# Patient Record
Sex: FEMALE | Race: WHITE | Employment: FULL TIME | URBAN - METROPOLITAN AREA
[De-identification: names, ages, dates, MRNs, and addresses within clinical notes are randomized per-mention and may not be internally consistent; named-entity substitution may affect disease eponyms.]

---

## 2017-12-26 PROBLEM — Z3A.38 38 WEEKS GESTATION OF PREGNANCY: Status: ACTIVE | Noted: 2017-12-26

## 2017-12-30 PROBLEM — Z3A.39 39 WEEKS GESTATION OF PREGNANCY: Status: ACTIVE | Noted: 2017-12-30

## 2020-08-31 ENCOUNTER — TELEPHONE (OUTPATIENT)
Dept: ADMINISTRATIVE | Age: 30
End: 2020-08-31

## 2020-08-31 NOTE — TELEPHONE ENCOUNTER
requesting to become a new pt , asap needs a physical for work , has no preference as to who she sees, please advise

## 2020-09-10 ENCOUNTER — OFFICE VISIT (OUTPATIENT)
Dept: FAMILY MEDICINE CLINIC | Age: 30
End: 2020-09-10
Payer: COMMERCIAL

## 2020-09-10 VITALS
TEMPERATURE: 96.9 F | BODY MASS INDEX: 44.41 KG/M2 | SYSTOLIC BLOOD PRESSURE: 122 MMHG | OXYGEN SATURATION: 99 % | HEART RATE: 97 BPM | RESPIRATION RATE: 14 BRPM | HEIGHT: 68 IN | DIASTOLIC BLOOD PRESSURE: 78 MMHG | WEIGHT: 293 LBS

## 2020-09-10 PROBLEM — Z3A.38 38 WEEKS GESTATION OF PREGNANCY: Status: RESOLVED | Noted: 2017-12-26 | Resolved: 2020-09-10

## 2020-09-10 PROBLEM — Z3A.39 39 WEEKS GESTATION OF PREGNANCY: Status: RESOLVED | Noted: 2017-12-30 | Resolved: 2020-09-10

## 2020-09-10 PROBLEM — F41.9 ANXIETY DISORDER: Status: ACTIVE | Noted: 2017-01-01

## 2020-09-10 PROCEDURE — 1036F TOBACCO NON-USER: CPT | Performed by: NURSE PRACTITIONER

## 2020-09-10 PROCEDURE — 99203 OFFICE O/P NEW LOW 30 MIN: CPT | Performed by: NURSE PRACTITIONER

## 2020-09-10 PROCEDURE — G8419 CALC BMI OUT NRM PARAM NOF/U: HCPCS | Performed by: NURSE PRACTITIONER

## 2020-09-10 PROCEDURE — G8427 DOCREV CUR MEDS BY ELIG CLIN: HCPCS | Performed by: NURSE PRACTITIONER

## 2020-09-10 RX ORDER — ONDANSETRON 4 MG/1
4 TABLET, FILM COATED ORAL EVERY 8 HOURS PRN
COMMUNITY

## 2020-09-10 RX ORDER — METOCLOPRAMIDE 5 MG/1
5 TABLET ORAL 4 TIMES DAILY
COMMUNITY

## 2020-09-10 ASSESSMENT — ENCOUNTER SYMPTOMS
CONSTIPATION: 1
NAUSEA: 0
DIARRHEA: 0
SHORTNESS OF BREATH: 0
COUGH: 0
BACK PAIN: 0
WHEEZING: 0
VOMITING: 0

## 2020-09-10 ASSESSMENT — PATIENT HEALTH QUESTIONNAIRE - PHQ9
1. LITTLE INTEREST OR PLEASURE IN DOING THINGS: 0
2. FEELING DOWN, DEPRESSED OR HOPELESS: 0
SUM OF ALL RESPONSES TO PHQ QUESTIONS 1-9: 0
SUM OF ALL RESPONSES TO PHQ9 QUESTIONS 1 & 2: 0
SUM OF ALL RESPONSES TO PHQ QUESTIONS 1-9: 0

## 2020-09-10 NOTE — PROGRESS NOTES
Chief Complaint   Patient presents with   Elida Hough Establish Care     Used to see dr. Tati Thomason, who is no longer practicing in area. here to establish care. has a physical paper for work. pt is 25 weeks pregnant. HPI: The patient is a 30 y.o. female who presents today to establish care. The patient is 25 weeks pregnant. Suffered from post partum depression after her second child. Was taking Buspar low dose since she was breast feeding. Follows with Dr. Brittanie Cintron. Past Medical History:   Diagnosis Date    44 weeks gestation of pregnancy 12/30/2017    Anxiety disorder 2017    not tx due to pregnancy    Breast disorder 2010    bilateral breastreduction    Encounter for fetal anatomic survey     Migraine     Migraine     Postpartum depression 2014    was not recoginizd until later.        Past Surgical History:   Procedure Laterality Date    BREAST SURGERY      Bilat breast reduction    CHOLECYSTECTOMY  2014    GALLBLADDER SURGERY      REMOVAL      Family History   Problem Relation Age of Onset    No Known Problems Mother     Heart Disease Father     Heart Attack Father     No Known Problems Brother     Breast Cancer Maternal Grandmother      Social History     Socioeconomic History    Marital status: Legally      Spouse name: Not on file    Number of children: Not on file    Years of education: Not on file    Highest education level: Not on file   Occupational History    Not on file   Social Needs    Financial resource strain: Not on file    Food insecurity     Worry: Not on file     Inability: Not on file    Transportation needs     Medical: Not on file     Non-medical: Not on file   Tobacco Use    Smoking status: Former Smoker     Types: Cigarettes    Smokeless tobacco: Former User    Tobacco comment: very rarely   Substance and Sexual Activity    Alcohol use: No     Comment: social drinker    Drug use: No    Sexual activity: Yes     Partners: Male   Lifestyle    Physical activity     Days per week: Not on file     Minutes per session: Not on file    Stress: Not on file   Relationships    Social connections     Talks on phone: Not on file     Gets together: Not on file     Attends Taoism service: Not on file     Active member of club or organization: Not on file     Attends meetings of clubs or organizations: Not on file     Relationship status: Not on file    Intimate partner violence     Fear of current or ex partner: Not on file     Emotionally abused: Not on file     Physically abused: Not on file     Forced sexual activity: Not on file   Other Topics Concern    Not on file   Social History Narrative    Not on file      No Known Allergies     Prior to Visit Medications    Medication Sig Taking? Authorizing Provider   ondansetron (ZOFRAN) 4 MG tablet Take 4 mg by mouth every 8 hours as needed for Nausea or Vomiting Yes Historical Provider, MD   metoclopramide (REGLAN) 5 MG tablet Take 5 mg by mouth 4 times daily Yes Historical Provider, MD Sawyer w/o A-FeCbn-Meth-FA-DHA (PRENATE MINI PO) Take by mouth Yes Historical Provider, MD       Review of Systems   Constitutional: Negative for chills, diaphoresis and fever. Respiratory: Negative for cough, shortness of breath and wheezing. Cardiovascular: Negative for chest pain, palpitations and leg swelling. Gastrointestinal: Positive for constipation. Negative for diarrhea, nausea and vomiting. Endocrine: Negative for cold intolerance, heat intolerance, polydipsia and polyuria. Genitourinary: Positive for frequency. Negative for difficulty urinating. Musculoskeletal: Negative for back pain and neck pain. Neurological: Positive for headaches (occasional). Negative for dizziness and weakness. Physical Exam  Constitutional:       Appearance: She is well-developed. HENT:      Head: Normocephalic and atraumatic. Neck:      Thyroid: No thyromegaly. Trachea: No tracheal deviation. Cardiovascular:      Rate and Rhythm: Normal rate and regular rhythm. Heart sounds: No murmur. Pulmonary:      Effort: Pulmonary effort is normal.      Breath sounds: Normal breath sounds. Abdominal:      General: Bowel sounds are normal.      Palpations: Abdomen is soft. Tenderness: There is no abdominal tenderness. Musculoskeletal: Normal range of motion. Lymphadenopathy:      Cervical: No cervical adenopathy. Skin:     General: Skin is warm and dry. Neurological:      Mental Status: She is alert and oriented to person, place, and time. Psychiatric:         Behavior: Behavior normal.          /78   Pulse 97   Temp 96.9 °F (36.1 °C) (Temporal)   Resp 14   Ht 5' 8\" (1.727 m)   Wt (!) 304 lb (137.9 kg)   SpO2 99%   BMI 46.22 kg/m²     Assessment/Plan:  Onalee Nissen was seen today for establish care. Diagnoses and all orders for this visit:    25 weeks gestation of pregnancy  -  Continue to follow with OBGYN    Migraine without status migrainosus, not intractable, unspecified migraine type  -  Currently under control    Generalized anxiety disorder  -  Doing well, expects issues after her pregnancy  -  Follow up after delivery    Postpartum depression  -  Expects after delivery  -  Follow up after birth    As above. Call or go to ED immediately if symptoms worsen or persist.  No follow-ups on file. , or sooner if necessary. Educational materials and/or home exercises printed for patient's review and were included in patient instructions on his/her After Visit Summary and given to patient at the end of visit. Counseled regarding above diagnosis, including possible risks and complications,  especially if left uncontrolled. Counseled regarding the possibleside effects, risks, benefits and alternatives to treatment; patient and/or guardian verbalizes understanding, agrees, feels comfortable with and wishes to proceed with above treatment plan.     Advised patient to callwith any new medication issues, and read all Rx info from pharmacy to assure aware of all possible risks and side effects of medication before taking. Reviewed age and gender appropriate health screening exams andvaccinations. Advised patient regarding importance of keeping up with recommended health maintenance and to schedule as soon as possible if overdue, as this is important in assessing for undiagnosed pathology, especiallycancer, as well as protecting against potentially harmful/life threatening disease. Patient and/or guardian verbalizes understanding and agrees with above counseling, assessment and plan.

## 2020-11-07 ENCOUNTER — HOSPITAL ENCOUNTER (OUTPATIENT)
Age: 30
Discharge: HOME | End: 2020-11-07
Attending: OBSTETRICS & GYNECOLOGY | Admitting: OBSTETRICS & GYNECOLOGY
Payer: COMMERCIAL

## 2020-11-07 VITALS
RESPIRATION RATE: 16 BRPM | DIASTOLIC BLOOD PRESSURE: 79 MMHG | SYSTOLIC BLOOD PRESSURE: 138 MMHG | TEMPERATURE: 98.2 F | HEIGHT: 68 IN | HEART RATE: 103 BPM | WEIGHT: 293 LBS | BODY MASS INDEX: 44.41 KG/M2

## 2020-11-07 PROBLEM — Z3A.33 33 WEEKS GESTATION OF PREGNANCY: Status: ACTIVE | Noted: 2020-11-07

## 2020-11-07 PROCEDURE — 99211 OFF/OP EST MAY X REQ PHY/QHP: CPT

## 2020-11-07 PROCEDURE — 84112 EVAL AMNIOTIC FLUID PROTEIN: CPT

## 2020-11-07 PROCEDURE — 99201 HC NEW PT, OUTPT VISIT LEVEL 1: CPT

## 2020-11-07 ASSESSMENT — PAIN DESCRIPTION - PAIN TYPE: TYPE: ACUTE PAIN

## 2020-11-07 ASSESSMENT — PAIN DESCRIPTION - ORIENTATION: ORIENTATION: LOWER

## 2020-11-07 ASSESSMENT — PAIN DESCRIPTION - ONSET: ONSET: ON-GOING

## 2020-11-07 ASSESSMENT — PAIN DESCRIPTION - DESCRIPTORS: DESCRIPTORS: CRAMPING

## 2020-11-07 ASSESSMENT — PAIN DESCRIPTION - LOCATION: LOCATION: ABDOMEN

## 2020-11-07 ASSESSMENT — PAIN SCALES - GENERAL: PAINLEVEL_OUTOF10: 5

## 2020-11-07 ASSESSMENT — PAIN - FUNCTIONAL ASSESSMENT: PAIN_FUNCTIONAL_ASSESSMENT: ACTIVITIES ARE NOT PREVENTED

## 2020-11-07 ASSESSMENT — PAIN DESCRIPTION - FREQUENCY: FREQUENCY: INTERMITTENT

## 2020-11-07 ASSESSMENT — PAIN DESCRIPTION - PROGRESSION: CLINICAL_PROGRESSION: GRADUALLY WORSENING

## 2020-11-08 NOTE — PROGRESS NOTES
presents at 33.3 weeks gestation with complaints of contractions since 5pm, states contractions are irregular and she is unable to time them. Rating pain 5 out of 10. Patient also states she may be leaking fluid, amnisure in process. States +FM, denies VB.

## 2020-11-08 NOTE — H&P
Subjective:      Jerrell Frias is an 26 y.o. female at 35 and 3/7 weeks gestation presenting with   Chief Complaint   Patient presents with    Contractions     Since 5pm   .She is also complaining of contractions every a few minutes lasting few seconds. Other associated symptoms include low back pain. Fetal Movement: normal.  Past Medical History:   Diagnosis Date    39 weeks gestation of pregnancy 12/30/2017    Anxiety disorder 2017    not tx due to pregnancy    Breast disorder 2010    bilateral breastreduction    Encounter for fetal anatomic survey     Migraine     Migraine     Postpartum depression 2014    was not recoginizd until later. Review of Systems  Pertinent items are noted in HPI. Objective:      /79   Pulse 103   Temp 98.2 °F (36.8 °C) (Oral)   Resp 16   Ht 5' 8\" (1.727 m)   Wt (!) 304 lb (137.9 kg)   LMP 03/18/2020   BMI 46.22 kg/m²   Blood pressure 138/79, pulse 103, temperature 98.2 °F (36.8 °C), temperature source Oral, resp. rate 16, height 5' 8\" (1.727 m), weight (!) 304 lb (137.9 kg), last menstrual period 03/18/2020, unknown if currently breastfeeding. General:   alert, appears stated age and cooperative   Cervix:   closed.    FHT:   130 BPM     Lab Review    CBC:   Lab Results   Component Value Date    WBC 13.0 12/31/2017    RBC 3.93 12/31/2017    HGB 10.9 12/31/2017    HCT 34.0 12/31/2017    MCV 86.5 12/31/2017    MCH 27.7 12/31/2017    MCHC 32.1 12/31/2017    RDW 15.7 12/31/2017     12/31/2017    MPV 10.8 12/31/2017     CMP:    Lab Results   Component Value Date     09/11/2017    K 4.1 09/11/2017     09/11/2017    CO2 24 09/11/2017    BUN 7 09/11/2017    CREATININE 0.6 09/11/2017    GFRAA >60 09/11/2017    LABGLOM >60 09/11/2017    GLUCOSE 79 09/11/2017    PROT 6.3 09/11/2017    LABALBU 3.2 09/11/2017    CALCIUM 8.4 09/11/2017    BILITOT 0.3 09/11/2017    ALKPHOS 70 09/11/2017    AST 12 09/11/2017    ALT 12 09/11/2017     U/A:    Lab Results   Component Value Date    COLORU Yellow 09/11/2017    PHUR 6.0 09/11/2017    WBCUA >20 09/11/2017    RBCUA >20 09/11/2017    MUCUS Present 09/11/2017    BACTERIA MANY 09/11/2017    CLARITYU CLOUDY 09/11/2017    SPECGRAV >=1.030 09/11/2017    LEUKOCYTESUR SMALL 09/11/2017    UROBILINOGEN 0.2 09/11/2017    BILIRUBINUR Negative 09/11/2017    BLOODU LARGE 09/11/2017    GLUCOSEU Negative 09/11/2017     Amnisure: negative     Assessment:      Threatened Premature Labor      Plan:      Monitored for contractions - findings: no contractions noted and reactive strip. Orders: no treatment necessary.      Stephania Thacker MD,11/7/2020 9:15 PM

## 2020-11-15 LAB — SARS-COV-2: ABNORMAL

## 2020-11-17 RX ORDER — MULTIVIT WITH MINERALS/LUTEIN
1000 TABLET ORAL DAILY
Qty: 30 TABLET | Refills: 3 | Status: SHIPPED
Start: 2020-11-17 | End: 2021-03-22

## 2020-11-17 RX ORDER — ALBUTEROL SULFATE 90 UG/1
2 AEROSOL, METERED RESPIRATORY (INHALATION) EVERY 6 HOURS PRN
Qty: 1 INHALER | Refills: 5 | Status: SHIPPED | OUTPATIENT
Start: 2020-11-17

## 2020-11-17 RX ORDER — B-COMPLEX WITH VITAMIN C
1 TABLET ORAL DAILY
Qty: 30 TABLET | Refills: 0 | Status: ON HOLD
Start: 2020-11-17 | End: 2020-12-17 | Stop reason: HOSPADM

## 2020-11-17 RX ORDER — CHOLECALCIFEROL (VITAMIN D3) 50 MCG
2000 TABLET ORAL DAILY
Qty: 30 TABLET | Refills: 0 | Status: SHIPPED
Start: 2020-11-17 | End: 2020-12-28

## 2020-11-19 ENCOUNTER — HOSPITAL ENCOUNTER (INPATIENT)
Age: 30
LOS: 3 days | Discharge: HOME | DRG: 566 | End: 2020-11-23
Attending: EMERGENCY MEDICINE | Admitting: OBSTETRICS & GYNECOLOGY
Payer: COMMERCIAL

## 2020-11-19 ENCOUNTER — APPOINTMENT (OUTPATIENT)
Dept: GENERAL RADIOLOGY | Age: 30
DRG: 566 | End: 2020-11-19
Payer: COMMERCIAL

## 2020-11-19 LAB
ALBUMIN SERPL-MCNC: 3.1 G/DL (ref 3.5–5.2)
ALP BLD-CCNC: 234 U/L (ref 35–104)
ALT SERPL-CCNC: 53 U/L (ref 0–32)
ANION GAP SERPL CALCULATED.3IONS-SCNC: 19 MMOL/L (ref 7–16)
AST SERPL-CCNC: 52 U/L (ref 0–31)
ATYPICAL LYMPHOCYTE RELATIVE PERCENT: 2 % (ref 0–4)
BASOPHILS ABSOLUTE: 0 E9/L (ref 0–0.2)
BASOPHILS RELATIVE PERCENT: 0 % (ref 0–2)
BILIRUB SERPL-MCNC: 1.8 MG/DL (ref 0–1.2)
BILIRUBIN DIRECT: 1.5 MG/DL (ref 0–0.3)
BILIRUBIN, INDIRECT: 0.3 MG/DL (ref 0–1)
BUN BLDV-MCNC: 5 MG/DL (ref 6–20)
BURR CELLS: ABNORMAL
CALCIUM SERPL-MCNC: 8.8 MG/DL (ref 8.6–10.2)
CHLORIDE BLD-SCNC: 102 MMOL/L (ref 98–107)
CO2: 19 MMOL/L (ref 22–29)
CREAT SERPL-MCNC: 0.7 MG/DL (ref 0.5–1)
EOSINOPHILS ABSOLUTE: 0 E9/L (ref 0.05–0.5)
EOSINOPHILS RELATIVE PERCENT: 0 % (ref 0–6)
GFR AFRICAN AMERICAN: >60
GFR NON-AFRICAN AMERICAN: >60 ML/MIN/1.73
GLUCOSE BLD-MCNC: 115 MG/DL (ref 74–99)
HCT VFR BLD CALC: 47.6 % (ref 34–48)
HEMOGLOBIN: 14.7 G/DL (ref 11.5–15.5)
LIPASE: 9 U/L (ref 13–60)
LYMPHOCYTES ABSOLUTE: 1.2 E9/L (ref 1.5–4)
LYMPHOCYTES RELATIVE PERCENT: 7 % (ref 20–42)
MCH RBC QN AUTO: 27.2 PG (ref 26–35)
MCHC RBC AUTO-ENTMCNC: 30.9 % (ref 32–34.5)
MCV RBC AUTO: 88 FL (ref 80–99.9)
METAMYELOCYTES RELATIVE PERCENT: 6 % (ref 0–1)
MONOCYTES ABSOLUTE: 0.27 E9/L (ref 0.1–0.95)
MONOCYTES RELATIVE PERCENT: 2 % (ref 2–12)
MYELOCYTE PERCENT: 3 % (ref 0–0)
NEUTROPHILS ABSOLUTE: 11.84 E9/L (ref 1.8–7.3)
NEUTROPHILS RELATIVE PERCENT: 80 % (ref 43–80)
OVALOCYTES: ABNORMAL
PDW BLD-RTO: 16.4 FL (ref 11.5–15)
PLATELET # BLD: 307 E9/L (ref 130–450)
PMV BLD AUTO: 10.1 FL (ref 7–12)
POIKILOCYTES: ABNORMAL
POLYCHROMASIA: ABNORMAL
POTASSIUM SERPL-SCNC: 3.5 MMOL/L (ref 3.5–5)
RBC # BLD: 5.41 E12/L (ref 3.5–5.5)
SODIUM BLD-SCNC: 140 MMOL/L (ref 132–146)
TOTAL PROTEIN: 7.4 G/DL (ref 6.4–8.3)
TROPONIN: <0.01 NG/ML (ref 0–0.03)
VACUOLATED NEUTROPHILS: ABNORMAL
WBC # BLD: 13.3 E9/L (ref 4.5–11.5)

## 2020-11-19 PROCEDURE — 80076 HEPATIC FUNCTION PANEL: CPT

## 2020-11-19 PROCEDURE — 99284 EMERGENCY DEPT VISIT MOD MDM: CPT

## 2020-11-19 PROCEDURE — 96374 THER/PROPH/DIAG INJ IV PUSH: CPT

## 2020-11-19 PROCEDURE — 93005 ELECTROCARDIOGRAM TRACING: CPT | Performed by: EMERGENCY MEDICINE

## 2020-11-19 PROCEDURE — 80048 BASIC METABOLIC PNL TOTAL CA: CPT

## 2020-11-19 PROCEDURE — 6360000002 HC RX W HCPCS: Performed by: EMERGENCY MEDICINE

## 2020-11-19 PROCEDURE — 85025 COMPLETE CBC W/AUTO DIFF WBC: CPT

## 2020-11-19 PROCEDURE — 71045 X-RAY EXAM CHEST 1 VIEW: CPT

## 2020-11-19 PROCEDURE — 84484 ASSAY OF TROPONIN QUANT: CPT

## 2020-11-19 PROCEDURE — 2580000003 HC RX 258: Performed by: EMERGENCY MEDICINE

## 2020-11-19 PROCEDURE — 83690 ASSAY OF LIPASE: CPT

## 2020-11-19 RX ORDER — SODIUM CHLORIDE 0.9 % (FLUSH) 0.9 %
SYRINGE (ML) INJECTION
Status: DISPENSED
Start: 2020-11-19 | End: 2020-11-20

## 2020-11-19 RX ORDER — 0.9 % SODIUM CHLORIDE 0.9 %
1000 INTRAVENOUS SOLUTION INTRAVENOUS ONCE
Status: COMPLETED | OUTPATIENT
Start: 2020-11-19 | End: 2020-11-20

## 2020-11-19 RX ORDER — ONDANSETRON 2 MG/ML
4 INJECTION INTRAMUSCULAR; INTRAVENOUS ONCE
Status: COMPLETED | OUTPATIENT
Start: 2020-11-19 | End: 2020-11-19

## 2020-11-19 RX ADMIN — SODIUM CHLORIDE 1000 ML: 9 INJECTION, SOLUTION INTRAVENOUS at 23:36

## 2020-11-19 RX ADMIN — ONDANSETRON 4 MG: 2 INJECTION INTRAMUSCULAR; INTRAVENOUS at 23:36

## 2020-11-19 NOTE — ED NOTES
FIRST PROVIDER CONTACT ASSESSMENT NOTE      Department of Emergency Medicine   11/19/20  5:20 PM EST    Chief Complaint: Shortness of Breath (COVID pos on sunday ); Chest Congestion; Cough; Fever; and Generalized Body Aches      History of Present Illness:   Colten Leonardo is a 26 y.o. female who presents to the ED for shortness of breath, chest pain cough, fever. She states she has generalized body aches. She is currently 35 weeks pregnant. She tested positive for Covid on Sunday and her symptoms started a week ago. Her OB/GYN is Dr. Nicola Cochran. She denies any vaginal bleeding or discharge. Patient states her fever was 103 but she did not take anything her for fever at that time. Patient has been completing fetal kicks. Medical History:  has a past medical history of 39 weeks gestation of pregnancy, Anxiety disorder, Breast disorder, Encounter for fetal anatomic survey, Migraine, Migraine, and Postpartum depression. Surgical History:  has a past surgical history that includes Breast surgery; Cholecystectomy (2014); and Gallbladder surgery. Social History:  reports that she has quit smoking. Her smoking use included cigarettes. She has quit using smokeless tobacco. She reports that she does not drink alcohol or use drugs. Family History: family history includes Breast Cancer in her maternal grandmother; Heart Attack in her father; Heart Disease in her father; No Known Problems in her brother and mother. *ALLERGIES*     Patient has no known allergies. Physical Exam:      VS:  /85   Pulse 121   Temp 98.5 °F (36.9 °C)   Resp 18   Ht 5' 8\" (1.727 m)   Wt (!) 304 lb (137.9 kg)   LMP 03/18/2020   SpO2 93%   BMI 46.22 kg/m²      Initial Plan of Care:  Initiate Treatment-Testing, Proceed toTreatment Area When Bed Available for ED Attending/MLP to Continue Care.   Instructed patient to notify nursing staff of any worsening symptoms to be reevaluated  -----------------END OF FIRST PROVIDER

## 2020-11-20 PROBLEM — U07.1 ACUTE RESPIRATORY FAILURE DUE TO COVID-19 (HCC): Status: ACTIVE | Noted: 2020-11-20

## 2020-11-20 PROBLEM — R80.9 PROTEINURIA: Status: ACTIVE | Noted: 2020-11-20

## 2020-11-20 PROBLEM — R74.8 ELEVATED LIVER ENZYMES: Status: ACTIVE | Noted: 2020-11-20

## 2020-11-20 PROBLEM — U07.1 PNEUMONIA DUE TO COVID-19 VIRUS: Status: ACTIVE | Noted: 2020-11-20

## 2020-11-20 PROBLEM — J96.00 ACUTE RESPIRATORY FAILURE DUE TO COVID-19 (HCC): Status: ACTIVE | Noted: 2020-11-20

## 2020-11-20 PROBLEM — J12.82 PNEUMONIA DUE TO COVID-19 VIRUS: Status: ACTIVE | Noted: 2020-11-20

## 2020-11-20 PROBLEM — Z3A.35 35 WEEKS GESTATION OF PREGNANCY: Status: ACTIVE | Noted: 2020-11-20

## 2020-11-20 LAB
ABO/RH: NORMAL
ALBUMIN SERPL-MCNC: 2.7 G/DL (ref 3.5–5.2)
ALBUMIN SERPL-MCNC: 2.9 G/DL (ref 3.5–5.2)
ALP BLD-CCNC: 180 U/L (ref 35–104)
ALP BLD-CCNC: 194 U/L (ref 35–104)
ALT SERPL-CCNC: 44 U/L (ref 0–32)
ALT SERPL-CCNC: 49 U/L (ref 0–32)
AMORPHOUS: ABNORMAL
AMPHETAMINE SCREEN, URINE: NOT DETECTED
ANION GAP SERPL CALCULATED.3IONS-SCNC: 15 MMOL/L (ref 7–16)
ANION GAP SERPL CALCULATED.3IONS-SCNC: 17 MMOL/L (ref 7–16)
ANTIBODY SCREEN: NORMAL
AST SERPL-CCNC: 51 U/L (ref 0–31)
AST SERPL-CCNC: 68 U/L (ref 0–31)
BACTERIA: ABNORMAL /HPF
BACTERIA: ABNORMAL /HPF
BARBITURATE SCREEN URINE: NOT DETECTED
BASOPHILS ABSOLUTE: 0 E9/L (ref 0–0.2)
BASOPHILS RELATIVE PERCENT: 0 % (ref 0–2)
BENZODIAZEPINE SCREEN, URINE: NOT DETECTED
BILIRUB SERPL-MCNC: 1.8 MG/DL (ref 0–1.2)
BILIRUB SERPL-MCNC: 1.9 MG/DL (ref 0–1.2)
BILIRUBIN URINE: ABNORMAL
BILIRUBIN URINE: ABNORMAL
BLOOD, URINE: ABNORMAL
BLOOD, URINE: NEGATIVE
BUN BLDV-MCNC: 7 MG/DL (ref 6–20)
BUN BLDV-MCNC: 8 MG/DL (ref 6–20)
BURR CELLS: ABNORMAL
C-REACTIVE PROTEIN: 7.5 MG/DL (ref 0–0.4)
CALCIUM SERPL-MCNC: 8.2 MG/DL (ref 8.6–10.2)
CALCIUM SERPL-MCNC: 8.3 MG/DL (ref 8.6–10.2)
CANNABINOID SCREEN URINE: NOT DETECTED
CHLORIDE BLD-SCNC: 103 MMOL/L (ref 98–107)
CHLORIDE BLD-SCNC: 103 MMOL/L (ref 98–107)
CLARITY: ABNORMAL
CLARITY: CLEAR
CO2: 18 MMOL/L (ref 22–29)
CO2: 20 MMOL/L (ref 22–29)
COCAINE METABOLITE SCREEN URINE: NOT DETECTED
COLOR: ABNORMAL
COLOR: YELLOW
CREAT SERPL-MCNC: 0.6 MG/DL (ref 0.5–1)
CREAT SERPL-MCNC: 0.6 MG/DL (ref 0.5–1)
CREATININE URINE: 273 MG/DL (ref 29–226)
D DIMER: 512 NG/ML DDU
EKG ATRIAL RATE: 124 BPM
EKG P AXIS: 74 DEGREES
EKG P-R INTERVAL: 138 MS
EKG Q-T INTERVAL: 300 MS
EKG QRS DURATION: 72 MS
EKG QTC CALCULATION (BAZETT): 431 MS
EKG R AXIS: 73 DEGREES
EKG T AXIS: 66 DEGREES
EKG VENTRICULAR RATE: 124 BPM
EOSINOPHILS ABSOLUTE: 0 E9/L (ref 0.05–0.5)
EOSINOPHILS RELATIVE PERCENT: 0 % (ref 0–6)
EPITHELIAL CELLS, UA: ABNORMAL /HPF
FENTANYL SCREEN, URINE: NOT DETECTED
GFR AFRICAN AMERICAN: >60
GFR AFRICAN AMERICAN: >60
GFR NON-AFRICAN AMERICAN: >60 ML/MIN/1.73
GFR NON-AFRICAN AMERICAN: >60 ML/MIN/1.73
GLUCOSE BLD-MCNC: 131 MG/DL (ref 74–99)
GLUCOSE BLD-MCNC: 151 MG/DL (ref 74–99)
GLUCOSE URINE: 100 MG/DL
GLUCOSE URINE: 100 MG/DL
HCT VFR BLD CALC: 41.8 % (ref 34–48)
HCT VFR BLD CALC: 45.6 % (ref 34–48)
HEMOGLOBIN: 12.8 G/DL (ref 11.5–15.5)
HEMOGLOBIN: 13.8 G/DL (ref 11.5–15.5)
KETONES, URINE: >=80 MG/DL
KETONES, URINE: >=80 MG/DL
LEUKOCYTE ESTERASE, URINE: NEGATIVE
LEUKOCYTE ESTERASE, URINE: NEGATIVE
LYMPHOCYTES ABSOLUTE: 0.76 E9/L (ref 1.5–4)
LYMPHOCYTES RELATIVE PERCENT: 7.8 % (ref 20–42)
Lab: NORMAL
MCH RBC QN AUTO: 27.1 PG (ref 26–35)
MCH RBC QN AUTO: 27.4 PG (ref 26–35)
MCHC RBC AUTO-ENTMCNC: 30.3 % (ref 32–34.5)
MCHC RBC AUTO-ENTMCNC: 30.6 % (ref 32–34.5)
MCV RBC AUTO: 89.5 FL (ref 80–99.9)
MCV RBC AUTO: 89.6 FL (ref 80–99.9)
METHADONE SCREEN, URINE: NOT DETECTED
MONOCYTES ABSOLUTE: 0.19 E9/L (ref 0.1–0.95)
MONOCYTES RELATIVE PERCENT: 1.8 % (ref 2–12)
NEUTROPHILS ABSOLUTE: 8.55 E9/L (ref 1.8–7.3)
NEUTROPHILS RELATIVE PERCENT: 90.4 % (ref 43–80)
NITRITE, URINE: NEGATIVE
NITRITE, URINE: POSITIVE
NUCLEATED RED BLOOD CELLS: 0 /100 WBC
OPIATE SCREEN URINE: NOT DETECTED
OXYCODONE URINE: NOT DETECTED
PDW BLD-RTO: 16.2 FL (ref 11.5–15)
PDW BLD-RTO: 16.3 FL (ref 11.5–15)
PH UA: 6 (ref 5–9)
PH UA: 6.5 (ref 5–9)
PHENCYCLIDINE SCREEN URINE: NOT DETECTED
PLATELET # BLD: 285 E9/L (ref 130–450)
PLATELET # BLD: 290 E9/L (ref 130–450)
PMV BLD AUTO: 10.3 FL (ref 7–12)
PMV BLD AUTO: 10.5 FL (ref 7–12)
POIKILOCYTES: ABNORMAL
POLYCHROMASIA: ABNORMAL
POTASSIUM SERPL-SCNC: 3.1 MMOL/L (ref 3.5–5)
POTASSIUM SERPL-SCNC: 3.9 MMOL/L (ref 3.5–5)
PROCALCITONIN: 0.15 NG/ML (ref 0–0.08)
PROTEIN PROTEIN: 169 MG/DL (ref 0–12)
PROTEIN UA: 100 MG/DL
PROTEIN UA: 100 MG/DL
PROTEIN/CREAT RATIO: 0.6
PROTEIN/CREAT RATIO: 0.6 (ref 0–0.2)
RBC # BLD: 4.67 E12/L (ref 3.5–5.5)
RBC # BLD: 5.09 E12/L (ref 3.5–5.5)
RBC UA: ABNORMAL /HPF (ref 0–2)
RBC UA: ABNORMAL /HPF (ref 0–2)
SODIUM BLD-SCNC: 138 MMOL/L (ref 132–146)
SODIUM BLD-SCNC: 138 MMOL/L (ref 132–146)
SPECIFIC GRAVITY UA: >=1.03 (ref 1–1.03)
SPECIFIC GRAVITY UA: >=1.03 (ref 1–1.03)
TOTAL PROTEIN: 6.3 G/DL (ref 6.4–8.3)
TOTAL PROTEIN: 6.8 G/DL (ref 6.4–8.3)
TROPONIN: <0.01 NG/ML (ref 0–0.03)
UROBILINOGEN, URINE: 2 E.U./DL
UROBILINOGEN, URINE: 4 E.U./DL
WBC # BLD: 10.9 E9/L (ref 4.5–11.5)
WBC # BLD: 9.5 E9/L (ref 4.5–11.5)
WBC UA: ABNORMAL /HPF (ref 0–5)
WBC UA: ABNORMAL /HPF (ref 0–5)

## 2020-11-20 PROCEDURE — 36415 COLL VENOUS BLD VENIPUNCTURE: CPT

## 2020-11-20 PROCEDURE — 76805 OB US >/= 14 WKS SNGL FETUS: CPT

## 2020-11-20 PROCEDURE — 84156 ASSAY OF PROTEIN URINE: CPT

## 2020-11-20 PROCEDURE — 99233 SBSQ HOSP IP/OBS HIGH 50: CPT | Performed by: FAMILY MEDICINE

## 2020-11-20 PROCEDURE — 80053 COMPREHEN METABOLIC PANEL: CPT

## 2020-11-20 PROCEDURE — 86901 BLOOD TYPING SEROLOGIC RH(D): CPT

## 2020-11-20 PROCEDURE — 51702 INSERT TEMP BLADDER CATH: CPT

## 2020-11-20 PROCEDURE — 81001 URINALYSIS AUTO W/SCOPE: CPT

## 2020-11-20 PROCEDURE — 1220000000 HC SEMI PRIVATE OB R&B

## 2020-11-20 PROCEDURE — 82570 ASSAY OF URINE CREATININE: CPT

## 2020-11-20 PROCEDURE — 76819 FETAL BIOPHYS PROFIL W/O NST: CPT | Performed by: OBSTETRICS & GYNECOLOGY

## 2020-11-20 PROCEDURE — 76819 FETAL BIOPHYS PROFIL W/O NST: CPT

## 2020-11-20 PROCEDURE — 85025 COMPLETE CBC W/AUTO DIFF WBC: CPT

## 2020-11-20 PROCEDURE — 93010 ELECTROCARDIOGRAM REPORT: CPT | Performed by: INTERNAL MEDICINE

## 2020-11-20 PROCEDURE — 6360000002 HC RX W HCPCS

## 2020-11-20 PROCEDURE — 6370000000 HC RX 637 (ALT 250 FOR IP): Performed by: FAMILY MEDICINE

## 2020-11-20 PROCEDURE — 76805 OB US >/= 14 WKS SNGL FETUS: CPT | Performed by: OBSTETRICS & GYNECOLOGY

## 2020-11-20 PROCEDURE — 99253 IP/OBS CNSLTJ NEW/EST LOW 45: CPT | Performed by: OBSTETRICS & GYNECOLOGY

## 2020-11-20 PROCEDURE — 86140 C-REACTIVE PROTEIN: CPT

## 2020-11-20 PROCEDURE — 84145 PROCALCITONIN (PCT): CPT

## 2020-11-20 PROCEDURE — 80307 DRUG TEST PRSMV CHEM ANLYZR: CPT

## 2020-11-20 PROCEDURE — 85027 COMPLETE CBC AUTOMATED: CPT

## 2020-11-20 PROCEDURE — 84484 ASSAY OF TROPONIN QUANT: CPT

## 2020-11-20 PROCEDURE — 86900 BLOOD TYPING SEROLOGIC ABO: CPT

## 2020-11-20 PROCEDURE — 76820 UMBILICAL ARTERY ECHO: CPT

## 2020-11-20 PROCEDURE — 6360000002 HC RX W HCPCS: Performed by: INTERNAL MEDICINE

## 2020-11-20 PROCEDURE — 86850 RBC ANTIBODY SCREEN: CPT

## 2020-11-20 PROCEDURE — 2580000003 HC RX 258: Performed by: OBSTETRICS & GYNECOLOGY

## 2020-11-20 PROCEDURE — 6360000002 HC RX W HCPCS: Performed by: OBSTETRICS & GYNECOLOGY

## 2020-11-20 PROCEDURE — 6370000000 HC RX 637 (ALT 250 FOR IP): Performed by: INTERNAL MEDICINE

## 2020-11-20 PROCEDURE — 85378 FIBRIN DEGRADE SEMIQUANT: CPT

## 2020-11-20 PROCEDURE — 76820 UMBILICAL ARTERY ECHO: CPT | Performed by: OBSTETRICS & GYNECOLOGY

## 2020-11-20 PROCEDURE — 99253 IP/OBS CNSLTJ NEW/EST LOW 45: CPT | Performed by: INTERNAL MEDICINE

## 2020-11-20 RX ORDER — POTASSIUM BICARBONATE 25 MEQ/1
25 TABLET, EFFERVESCENT ORAL 2 TIMES DAILY
Status: DISCONTINUED | OUTPATIENT
Start: 2020-11-20 | End: 2020-11-20 | Stop reason: CLARIF

## 2020-11-20 RX ORDER — DEXTROSE, SODIUM CHLORIDE, SODIUM LACTATE, POTASSIUM CHLORIDE, AND CALCIUM CHLORIDE 5; .6; .31; .03; .02 G/100ML; G/100ML; G/100ML; G/100ML; G/100ML
INJECTION, SOLUTION INTRAVENOUS ONCE
Status: COMPLETED | OUTPATIENT
Start: 2020-11-20 | End: 2020-11-20

## 2020-11-20 RX ORDER — SODIUM CHLORIDE, SODIUM LACTATE, POTASSIUM CHLORIDE, CALCIUM CHLORIDE 600; 310; 30; 20 MG/100ML; MG/100ML; MG/100ML; MG/100ML
INJECTION, SOLUTION INTRAVENOUS CONTINUOUS
Status: DISCONTINUED | OUTPATIENT
Start: 2020-11-20 | End: 2020-11-23 | Stop reason: HOSPADM

## 2020-11-20 RX ORDER — ACETAMINOPHEN 500 MG
500 TABLET ORAL EVERY 4 HOURS PRN
Status: DISCONTINUED | OUTPATIENT
Start: 2020-11-20 | End: 2020-11-23 | Stop reason: HOSPADM

## 2020-11-20 RX ORDER — ACETAMINOPHEN AND CODEINE PHOSPHATE 300; 30 MG/1; MG/1
1 TABLET ORAL EVERY 4 HOURS PRN
Status: DISCONTINUED | OUTPATIENT
Start: 2020-11-20 | End: 2020-11-23 | Stop reason: HOSPADM

## 2020-11-20 RX ORDER — BENZONATATE 100 MG/1
100 CAPSULE ORAL 3 TIMES DAILY PRN
Status: DISCONTINUED | OUTPATIENT
Start: 2020-11-20 | End: 2020-11-23 | Stop reason: HOSPADM

## 2020-11-20 RX ORDER — ONDANSETRON 2 MG/ML
4 INJECTION INTRAMUSCULAR; INTRAVENOUS ONCE
Status: COMPLETED | OUTPATIENT
Start: 2020-11-20 | End: 2020-11-20

## 2020-11-20 RX ORDER — ONDANSETRON 2 MG/ML
4 INJECTION INTRAMUSCULAR; INTRAVENOUS EVERY 6 HOURS PRN
Status: DISCONTINUED | OUTPATIENT
Start: 2020-11-20 | End: 2020-11-23 | Stop reason: HOSPADM

## 2020-11-20 RX ORDER — BETAMETHASONE SODIUM PHOSPHATE AND BETAMETHASONE ACETATE 3; 3 MG/ML; MG/ML
12 INJECTION, SUSPENSION INTRA-ARTICULAR; INTRALESIONAL; INTRAMUSCULAR; SOFT TISSUE DAILY
Status: DISCONTINUED | OUTPATIENT
Start: 2020-11-20 | End: 2020-11-22

## 2020-11-20 RX ORDER — ONDANSETRON 2 MG/ML
INJECTION INTRAMUSCULAR; INTRAVENOUS
Status: COMPLETED
Start: 2020-11-20 | End: 2020-11-20

## 2020-11-20 RX ORDER — CEFTRIAXONE 1 G/1
INJECTION, POWDER, FOR SOLUTION INTRAMUSCULAR; INTRAVENOUS
Status: DISPENSED
Start: 2020-11-20 | End: 2020-11-21

## 2020-11-20 RX ORDER — DEXAMETHASONE SODIUM PHOSPHATE 10 MG/ML
6 INJECTION, SOLUTION INTRAMUSCULAR; INTRAVENOUS ONCE
Status: COMPLETED | OUTPATIENT
Start: 2020-11-20 | End: 2020-11-20

## 2020-11-20 RX ORDER — PROMETHAZINE HYDROCHLORIDE 25 MG/ML
6.25 INJECTION, SOLUTION INTRAMUSCULAR; INTRAVENOUS EVERY 6 HOURS PRN
Status: DISCONTINUED | OUTPATIENT
Start: 2020-11-20 | End: 2020-11-23 | Stop reason: HOSPADM

## 2020-11-20 RX ADMIN — BETAMETHASONE SODIUM PHOSPHATE AND BETAMETHASONE ACETATE 12 MG: 3; 3 INJECTION, SUSPENSION INTRA-ARTICULAR; INTRALESIONAL; INTRAMUSCULAR; SOFT TISSUE at 07:41

## 2020-11-20 RX ADMIN — POTASSIUM BICARBONATE 20 MEQ: 782 TABLET, EFFERVESCENT ORAL at 21:00

## 2020-11-20 RX ADMIN — PROMETHAZINE HYDROCHLORIDE 6.25 MG: 25 INJECTION INTRAMUSCULAR; INTRAVENOUS at 17:17

## 2020-11-20 RX ADMIN — SODIUM CHLORIDE, SODIUM LACTATE, POTASSIUM CHLORIDE, CALCIUM CHLORIDE AND DEXTROSE MONOHYDRATE: 5; 600; 310; 30; 20 INJECTION, SOLUTION INTRAVENOUS at 01:37

## 2020-11-20 RX ADMIN — CEFTRIAXONE 1 G: 1 INJECTION, POWDER, FOR SOLUTION INTRAMUSCULAR; INTRAVENOUS at 15:36

## 2020-11-20 RX ADMIN — BENZONATATE 100 MG: 100 CAPSULE ORAL at 21:11

## 2020-11-20 RX ADMIN — PROMETHAZINE HYDROCHLORIDE 6.25 MG: 25 INJECTION INTRAMUSCULAR; INTRAVENOUS at 11:13

## 2020-11-20 RX ADMIN — DEXAMETHASONE SODIUM PHOSPHATE 6 MG: 10 INJECTION, SOLUTION INTRAMUSCULAR; INTRAVENOUS at 03:46

## 2020-11-20 RX ADMIN — ONDANSETRON 4 MG: 2 INJECTION INTRAMUSCULAR; INTRAVENOUS at 02:16

## 2020-11-20 RX ADMIN — ENOXAPARIN SODIUM 30 MG: 40 INJECTION SUBCUTANEOUS at 20:58

## 2020-11-20 RX ADMIN — PROMETHAZINE HYDROCHLORIDE 6.25 MG: 25 INJECTION INTRAMUSCULAR; INTRAVENOUS at 22:48

## 2020-11-20 RX ADMIN — PROMETHAZINE HYDROCHLORIDE 6.25 MG: 25 INJECTION INTRAMUSCULAR; INTRAVENOUS at 04:39

## 2020-11-20 RX ADMIN — ONDANSETRON 4 MG: 2 INJECTION INTRAMUSCULAR; INTRAVENOUS at 21:00

## 2020-11-20 RX ADMIN — ENOXAPARIN SODIUM 30 MG: 40 INJECTION SUBCUTANEOUS at 09:00

## 2020-11-20 RX ADMIN — SODIUM CHLORIDE, POTASSIUM CHLORIDE, SODIUM LACTATE AND CALCIUM CHLORIDE: 600; 310; 30; 20 INJECTION, SOLUTION INTRAVENOUS at 04:38

## 2020-11-20 RX ADMIN — SODIUM CHLORIDE, SODIUM LACTATE, POTASSIUM CHLORIDE, CALCIUM CHLORIDE AND DEXTROSE MONOHYDRATE: 5; 600; 310; 30; 20 INJECTION, SOLUTION INTRAVENOUS at 03:09

## 2020-11-20 NOTE — ED PROVIDER NOTES
Ariadna De is a 26 y.o. female presenting to the ED for cough congestion, + covid , beginning 1 week ago. The complaint has been persistent, moderate in severity, and worsened by nothing. 25 yo  @ 35 weeks f/w dr Su Washington, was diagnosed w covid  has been sick for 1 week. Feels she got sick from a family member,. Pt c/o cough increased sob, she had diarrhea but it has resolved. She noted some diminished taste. She is c./o decreased fetal movement for past few hours and lower abd pain. FHTs by     Review of Systems:   Pertinent positives and negatives are stated within HPI, all other systems reviewed and are negative.          --------------------------------------------- PAST HISTORY ---------------------------------------------  Past Medical History:  has a past medical history of 39 weeks gestation of pregnancy, Anxiety disorder, Breast disorder, Encounter for fetal anatomic survey, Migraine, Migraine, and Postpartum depression. Past Surgical History:  has a past surgical history that includes Breast surgery; Cholecystectomy (); and Gallbladder surgery. Social History:  reports that she has quit smoking. Her smoking use included cigarettes. She has quit using smokeless tobacco. She reports that she does not drink alcohol or use drugs. Family History: family history includes Breast Cancer in her maternal grandmother; Heart Attack in her father; Heart Disease in her father; No Known Problems in her brother and mother. The patients home medications have been reviewed. Allergies: Patient has no known allergies.     -------------------------------------------------- RESULTS -------------------------------------------------  All laboratory and radiology results have been personally reviewed by myself   LABS:  Results for orders placed or performed during the hospital encounter of 20   CBC auto differential   Result Value Ref Range    WBC 13.3 (H) 4.5 - ms    Q-T Interval 300 ms    QTc Calculation (Bazett) 431 ms    P Axis 74 degrees    R Axis 73 degrees    T Axis 66 degrees       RADIOLOGY:  Interpreted by Radiologist.  XR CHEST 1 VIEW   Final Result   Interstitial prominence in perihilar locations could suggest peribronchial   inflammatory changes. No focal airspace opacity or pleural effusion.          ------------------------- NURSING NOTES AND VITALS REVIEWED ---------------------------   The nursing notes within the ED encounter and vital signs as below have been reviewed. BP (!) 131/91   Pulse 110   Temp 98.4 °F (36.9 °C) (Oral)   Resp 18   Ht 5' 8\" (1.727 m)   Wt (!) 304 lb (137.9 kg)   LMP 03/18/2020   SpO2 94%   BMI 46.22 kg/m²   Oxygen Saturation Interpretation: Normal      ---------------------------------------------------PHYSICAL EXAM--------------------------------------    Physical Exam  Vitals signs reviewed. Constitutional:       General: She is not in acute distress. Appearance: Normal appearance. She is not toxic-appearing. HENT:      Head: Normocephalic and atraumatic. Right Ear: External ear normal.      Left Ear: External ear normal.      Nose: Nose normal. No congestion. Mouth/Throat:      Mouth: Mucous membranes are moist.      Pharynx: Oropharynx is clear. No posterior oropharyngeal erythema. Eyes:      Extraocular Movements: Extraocular movements intact. Pupils: Pupils are equal, round, and reactive to light. Neck:      Musculoskeletal: Normal range of motion and neck supple. No muscular tenderness. Cardiovascular:      Rate and Rhythm: Normal rate and regular rhythm. Pulses: Normal pulses. Heart sounds: No murmur. Pulmonary:      Effort: Pulmonary effort is normal.      Breath sounds: No wheezing or rhonchi. Chest:      Chest wall: No tenderness. Abdominal:      General: Bowel sounds are normal.      Tenderness: There is no abdominal tenderness.  There is no right CVA tenderness, left CVA tenderness or guarding. Comments: Gravid 35 wks abdomen no pain   Musculoskeletal: Normal range of motion. General: No swelling or deformity. Right lower leg: She exhibits no tenderness. No edema. Left lower leg: She exhibits no tenderness. No edema. Skin:     General: Skin is warm and dry. Capillary Refill: Capillary refill takes less than 2 seconds. Coloration: Skin is not pale. Findings: No erythema. Neurological:      General: No focal deficit present. Mental Status: She is alert and oriented to person, place, and time. Psychiatric:         Mood and Affect: Mood normal.               ------------------------------ ED COURSE/MEDICAL DECISION MAKING----------------------  Medications   sodium chloride flush 0.9 % injection (has no administration in time range)   0.9 % sodium chloride bolus (1,000 mLs Intravenous New Bag 11/19/20 2336)   ondansetron (ZOFRAN) injection 4 mg (4 mg Intravenous Given 11/19/20 2336)         ED COURSE:  ED Course as of Nov 20 0120   Thu Nov 19, 2020   2332 D/w dr Constance Barton go to L&D    [RIVAS]      ED Course User Index  [RIVAS] Alisa Fish DO      LIMITED PELVIC BEDSIDE ULTRASOUND     Risks, benefits and alternatives (for applicable procedures below) described. Performed By: Sari Patel DO. Indication:  Fetal heart tones  Endocavitary probe utilized: no  Chaperone present for exam: no  Informed consent: The patient provided verbal consent for this procedure. .  Procedural Quadrants:     Bladder Full: no  IUP Identified: yes  Ectopic Pregnancy Identified: no  Pelvic Free Fluid: no  Fetal Heart Movement: 158  Other findings: none    This procedure was performed by Sari Patel on 11/19/20 at 1:20 AM      Medical Decision Making:   Pt has known covid, pox stable, cxr stable, pt c/o abd pain and pressure and decereased movement. US showed nml fetal heart beat.  Pt admitted to L&D, I d/w pts obgyn dr Constance Barton       Risks and benefits were discussed with patient for All medications dispensed and given in department as well prescriptions prescribed for home, . The patient elected to take the medicine. Pt instructed on warning signs and precautions for medication side effects. The patient was given warning signs for when to seek medical attention. Counseled regarding todays diagnosis, including possible risks and complications,  especially if left uncontrolled. Counseled regarding the possible side effects, risks, benefits and alternatives to treatment; patient and/or guardian verbalizes understanding, agrees, feels comfortable with and wishes to proceed with treatment plan. Advised patient to call her primary care physician with any new medication issues, and read all Rx info from pharmacy to assure aware of all possible risks and side effects of medication before taking. I did discuss warning signs for when to return to the Emergency Room, and the patient verbalized understanding      Counseling: The emergency provider has spoken with the patient and discussed todays results, in addition to providing specific details for the plan of care and counseling regarding the diagnosis and prognosis. Questions are answered at this time and they are agreeable with the plan.      --------------------------------- IMPRESSION AND DISPOSITION ---------------------------------    IMPRESSION  1. COVID-19    2. 35 weeks gestation of pregnancy    3. Generalized abdominal pain    4. Decreased fetal movements in third trimester, single or unspecified fetus        DISPOSITION  Disposition: Admit to L&D  Patient condition is fair      NOTE: This report was transcribed using voice recognition software.  Every effort was made to ensure accuracy; however, inadvertent computerized transcription errors may be present       Ximena Blackmon DO  11/20/20 0125

## 2020-11-20 NOTE — CONSULTS
UT) TABS tablet Take 1 tablet by mouth daily 11/17/20  Yes Bekha Lantigua, APRN - CNP   sertraline (ZOLOFT) 50 MG tablet Take 25 mg by mouth daily   Yes Historical Provider, MD   ondansetron (ZOFRAN) 4 MG tablet Take 4 mg by mouth every 8 hours as needed for Nausea or Vomiting   Yes Historical Provider, MD   metoclopramide (REGLAN) 5 MG tablet Take 5 mg by mouth 4 times daily   Yes Historical Provider, MD   Prenat w/o A-FeCbn-Meth-FA-DHA (PRENATE MINI PO) Take by mouth   Yes Historical Provider, MD   Zinc 100 MG TABS Take 1 tablet by mouth daily 11/17/20   Ene Valero APRN - CNP       Allergies:  Patient has no known allergies. Social History:   TOBACCO:   reports that she has quit smoking. Her smoking use included cigarettes. She has quit using smokeless tobacco.  ETOH:   reports no history of alcohol use. Family History:       Problem Relation Age of Onset    No Known Problems Mother     Heart Disease Father     Heart Attack Father     No Known Problems Brother     Breast Cancer Maternal Grandmother       Or deferred/otherwise considered non contributory to current admission  PHYSICAL EXAM:    VS: /76   Pulse 107   Temp 98.4 °F (36.9 °C) (Oral)   Resp 24   Ht 5' 8\" (1.727 m)   Wt (!) 304 lb (137.9 kg)   LMP 03/18/2020   SpO2 94%   BMI 46.22 kg/m²     General Appearance:     +resp mild distress.      Preservation of PPE-full repeat PE deferred  PE thru nursing  No wheezing, no rebound abd  Heart regular tachy        LABS:  CBC:   Lab Results   Component Value Date    WBC 10.9 11/20/2020    RBC 5.09 11/20/2020    HGB 13.8 11/20/2020    HCT 45.6 11/20/2020     11/20/2020    MCV 89.6 11/20/2020     BMP:    Lab Results   Component Value Date     11/20/2020    K 3.9 11/20/2020     11/20/2020    CO2 18 11/20/2020    BUN 7 11/20/2020    CREATININE 0.6 11/20/2020    GLUCOSE 131 11/20/2020    CALCIUM 8.2 11/20/2020     Hepatic Function Panel:    Lab Results   Component Value Date    ALKPHOS 194 2020    AST 68 2020    ALT 49 2020    PROT 6.8 2020    LABALBU 2.9 2020    BILITOT 1.9 2020     Magnesium:  No results found for: MG    PT/INR:  No results found for: PROTIME, INR  U/A:   Lab Results   Component Value Date    LEUKOCYTESUR Negative 2020    PHUR 6.0 2020    WBCUA 2-5 2020    RBCUA 1-3 2020    BACTERIA FEW 2020    SPECGRAV >=1.030 2020    BLOODU Negative 2020    GLUCOSEU 100 2020     ABG:  No results found for: PHART, AWI1UXV, PO2ART, V1WYLWMQ, XTE8RXQ, BEART  TSH:  No results found for: TSH  Cardiac Enzymes:   Lab Results   Component Value Date    TROPONINI <0.01 2020       Radiology: Xr Chest 1 View    Result Date: 2020  EXAMINATION: ONE XRAY VIEW OF THE CHEST 2020 9:36 pm COMPARISON: None. HISTORY: ORDERING SYSTEM PROVIDED HISTORY: CP TECHNOLOGIST PROVIDED HISTORY: Reason for exam:->CP FINDINGS: Interstitial prominence is present in perihilar locations. There are low lung volumes. No airspace opacity or pleural effusion. No pneumothorax. Interstitial prominence in perihilar locations could suggest peribronchial inflammatory changes. No focal airspace opacity or pleural effusion. Assessment & Plan   ACTIVE hospital problems being addressed/reassessed for this admission:  Active Problems:    35 weeks gestation of pregnancy  Resolved Problems:    * No resolved hospital problems.  *    Code status/DVT prophylaxis and PLAN --see orders   Note extensive time spent coordinating care between ER docs, ER and floor nurses, and transitioning care over to day providers  Plan of care/ clinical impressions/communication specifics detailed below:  26 y.o. female   @ 35 weeks f/w dr Summer Pierre  Healthy 35 week pregnant femiale  Tested covid +   Onset 9 days ago -- fevers, chills, then sorethroat, myalgia, dry cough, loss of tast.  Severe nausea and recurrent vomitus several times

## 2020-11-20 NOTE — PROGRESS NOTES
Dr. Kiesha Dill called in orders received to bolus another bag of D5LR then give LR @150, and consult covid team.

## 2020-11-20 NOTE — PROGRESS NOTES
presents at 35w2d for c/o SOB, fever, cough, chest tighness, vomiting, diarrhea, headache, sore throat, congestion, tightness in abdomen. Pt states pain in abdomen 7/10 on pain scale, describes it as tightness and constant. Denies VB/LOF. States decreased fetal movement since  when she started vomiting per patient.

## 2020-11-20 NOTE — H&P
100 MG TABS, Take 1 tablet by mouth daily    ALLERGIES:  Patient has no known allergies. SOCIAL HISTORY:  TOBACCO: Former smoker and use of smokeless tobacco  ETOH: Denies using any alcohol  DRUGS: Denies using any drugs        Review of Systems:   Ears, nose, mouth, throat, and face: Sore throat  Respiratory: Shortness of breath cough congestion  Cardiovascular: negative  Gastrointestinal: Vomiting and diarrhea  Genitourinary:Decreased fetal movement  Integument/breast: negative  Hematologic/lymphatic: negative  Musculoskeletal:negative  Neurological: negative  Behavioral/Psych: negative  Endocrine: negative  Allergic/Immunologic: negative    OBJECTIVE    Vitals:  /76   Pulse 107   Temp 98.4 °F (36.9 °C) (Oral)   Resp 24   Ht 5' 8\" (1.727 m)   Wt (!) 304 lb (137.9 kg)   LMP 03/18/2020   SpO2 94%   BMI 46.22 kg/m²     Patient was evaluated in the ER      Fetal heart rate:         Baseline Heart Rate: 150       Accelerations: Less than 15 beats       Decelerations: present some.   Late       Variability: Mild to  moderate    Contraction frequency: Occasional        General Labs:    CBC:   Lab Results   Component Value Date    WBC 10.9 11/20/2020    RBC 5.09 11/20/2020    HGB 13.8 11/20/2020    HCT 45.6 11/20/2020    MCV 89.6 11/20/2020    RDW 16.2 11/20/2020     11/20/2020     CMP:    Lab Results   Component Value Date     11/20/2020    K 3.9 11/20/2020     11/20/2020    CO2 18 11/20/2020    BUN 7 11/20/2020    PROT 6.8 11/20/2020     U/A:  No components found for: Brenda Valero, USPGRAV, UPH, Shidler Driggs, UKETONE, UBILI, UBLOOD, UNITRITE, UUROBIL, ULEUKEST, USQEPI, [place], UWBC, [place], [place], [place]        ASSESSMENT & PLAN:   Case was discussed with Dr. Wild Davila  Her plan: to observe  and to give  IV fluid and continue close monitoring

## 2020-11-20 NOTE — PROGRESS NOTES
Reviewed  Entire  Strip  Remotely with house  Dr Pratik Bateman some  lates  Pt  Currently  Having mod  varitility  And no  decels    Currently  Getting iv boluses and  Awaiting  Labs  And urine

## 2020-11-20 NOTE — PROGRESS NOTES
Called Dr. Tory Mendoza to update on patient status. FHR tracing reviewed, late decels, minimal variability, ctx's Q 3-5 mins, SVE 1cm/thick/-3 station. New orders obtained to give D5LR bolus 1 L, Zofran 4mg PRN once, and obtain a UA.

## 2020-11-20 NOTE — PROGRESS NOTES
Called dr. Santana Santizo, updated on Dr. Gokul Marquez ordering patient Dexamesthasone 6mg once IV. Labs reviewed with Dr. Santana Santizo.

## 2020-11-20 NOTE — CARE COORDINATION
SW Discharge Planning   LASHANDA received consult for \" community services.  A new  since September 2020.  Two children at home and one due within one month\"    LASHANDA was unable to meet with [facility]) in person due to her positive COVID test, however call to provide Addis with support. Addis reported that she has two other children who are currently staying with their aunt while Raj Smoker is hospitalized. Addis reported having a strong family and friend support system, and was pleasant on the phone. Addis denied any needs at this time, and was agreeable to LASHANDA providing her with information regarding community resources. Resources were provided to Addis's nurse to give to her and included [facility], counseling resources, WIC, etc.  also provided LASHANDA's desk number so that if any needs arise Raj Smoker can contact LASHANDA directly.      Electronically signed by EMMA Martinez on 11/20/2020 at 1:56 PM

## 2020-11-20 NOTE — PROGRESS NOTES
Spoke with  about patient low urine output. Wants to encourage patient to drink fluids. Will update .

## 2020-11-20 NOTE — PROGRESS NOTES
Called Dr. Shellie Hackett informed him of new consult, orders received to give 6mg Solumedrol once. States will be see pt.

## 2020-11-20 NOTE — PROGRESS NOTES
[facility] Progress Note    Admitting Date and Time: 11/19/2020  8:58 PM  Admit Dx: 35 weeks gestation of pregnancy [Z3A.35]    Subjective:  Patient is being followed for 35 weeks gestation of pregnancy [Z3A.35]     Pt feels better on 7 L of O2  O2 sat:  98%  on   Fever:  97.7 (36.5 C)    Per RN:   No new concerns    ROS: denies cp, n/v, HA unless stated above.       enoxaparin  30 mg Subcutaneous BID    betamethasone acetate-betamethasone sodium phosphate  12 mg Intramuscular Daily    sodium chloride flush         benzonatate, 100 mg, TID PRN  acetaminophen, 500 mg, Q4H PRN  promethazine, 6.25 mg, Q6H PRN  acetaminophen-codeine, 1 tablet, Q4H PRN         Objective:    BP (!) 139/90   Pulse 96   Temp 97.6 °F (36.4 °C) (Oral)   Resp 16   Ht 5' 8\" (1.727 m)   Wt (!) 304 lb (137.9 kg)   LMP 03/18/2020   SpO2 96%   BMI 46.22 kg/m²     General Appearance: alert and oriented to person, place and time and in no acute distress  Skin: warm and dry  Head: normocephalic and atraumatic  Eyes: pupils equal, round, and reactive to light, extraocular eye movements intact, conjunctivae normal  Neck: neck supple and non tender without mass   Pulmonary/Chest: crackles bilaterally- no wheezes or respiratory distress  Cardiovascular: normal rate, normal S1 and S2 and no carotid bruits  Abdomen: soft, non-tender, non-distended, normal bowel sounds, no masses or organomegaly  Extremities: no cyanosis, no clubbing and no edema  Neurologic: no cranial nerve deficit and speech normal        Recent Labs     11/19/20 1755 11/20/20  0243    138   K 3.5 3.9    103   CO2 19* 18*   BUN 5* 7   CREATININE 0.7 0.6   GLUCOSE 115* 131*   CALCIUM 8.8 8.2*       Recent Labs     11/19/20 1755 11/20/20  0243   WBC 13.3* 10.9   RBC 5.41 5.09   HGB 14.7 13.8   HCT 47.6 45.6   MCV 88.0 89.6   MCH 27.2 27.1   MCHC 30.9* 30.3*   RDW 16.4* 16.2*    290   MPV 10.1 10.5       Radiology:   No new xrays today    Assessment/Plan:    Principal Problem:    Pneumonia due to COVID-19 virus    Active Problems:    35 weeks gestation of pregnancy  On Monitor of Mom & baby's heart rate. Supportive care. Plan delivery when appropriate. Consult with )      Acute respiratory failure due to COVID-19 (HCC)  Continue Hi-Flow oxygen. Inhalers and steroids. SW and Case Management inquiring on return to facility      Elevated liver enzymes  2/2 to hypovolemia. Monitor and avoid meds, alcohol and toxins which can affect the liver and its daily functions. Proteinuria  Supportive Care. Continue meds     Resolved Problems:    * No resolved hospital problems. *      Plan:  1. Acute hypoxic respiratory failure, most likely due to viral pneumonia, O2 sat was below 90% on room air, requiring 7L nasal cannula  and oxygen saturation above 90%. Treating the underlying process. 2.  Acute viral pneumonia, respiratory panel is pending, procalcitonin was 0.15, CAT scan of the chest showed bilateral patchy groundglass opacities, sent for COVID-19, currently still pending.     Electronically signed by Alaina Spurling, MD on 11/20/2020 at 8:57 AM

## 2020-11-21 LAB
ALBUMIN SERPL-MCNC: 2.7 G/DL (ref 3.5–5.2)
ALP BLD-CCNC: 185 U/L (ref 35–104)
ALT SERPL-CCNC: 64 U/L (ref 0–32)
AMORPHOUS: ABNORMAL
ANION GAP SERPL CALCULATED.3IONS-SCNC: 14 MMOL/L (ref 7–16)
AST SERPL-CCNC: 91 U/L (ref 0–31)
BACTERIA: ABNORMAL /HPF
BILIRUB SERPL-MCNC: 1.4 MG/DL (ref 0–1.2)
BILIRUBIN URINE: ABNORMAL
BLOOD, URINE: ABNORMAL
BUN BLDV-MCNC: 9 MG/DL (ref 6–20)
CALCIUM SERPL-MCNC: 8.8 MG/DL (ref 8.6–10.2)
CHLORIDE BLD-SCNC: 102 MMOL/L (ref 98–107)
CLARITY: ABNORMAL
CO2: 22 MMOL/L (ref 22–29)
COLOR: ABNORMAL
CREAT SERPL-MCNC: 0.6 MG/DL (ref 0.5–1)
GFR AFRICAN AMERICAN: >60
GFR NON-AFRICAN AMERICAN: >60 ML/MIN/1.73
GLUCOSE BLD-MCNC: 125 MG/DL (ref 74–99)
GLUCOSE URINE: 100 MG/DL
KETONES, URINE: >=80 MG/DL
LEUKOCYTE ESTERASE, URINE: NEGATIVE
MAGNESIUM: 2 MG/DL (ref 1.6–2.6)
NITRITE, URINE: POSITIVE
PH UA: 6.5 (ref 5–9)
POTASSIUM REFLEX MAGNESIUM: 3.1 MMOL/L (ref 3.5–5)
POTASSIUM SERPL-SCNC: 3.7 MMOL/L (ref 3.5–5)
PROTEIN UA: 100 MG/DL
RBC UA: ABNORMAL /HPF (ref 0–2)
SODIUM BLD-SCNC: 138 MMOL/L (ref 132–146)
SPECIFIC GRAVITY UA: 1.02 (ref 1–1.03)
TOTAL PROTEIN: 6.2 G/DL (ref 6.4–8.3)
UROBILINOGEN, URINE: 4 E.U./DL
WBC UA: ABNORMAL /HPF (ref 0–5)

## 2020-11-21 PROCEDURE — 1220000000 HC SEMI PRIVATE OB R&B

## 2020-11-21 PROCEDURE — 83735 ASSAY OF MAGNESIUM: CPT

## 2020-11-21 PROCEDURE — 99233 SBSQ HOSP IP/OBS HIGH 50: CPT | Performed by: FAMILY MEDICINE

## 2020-11-21 PROCEDURE — 2580000003 HC RX 258: Performed by: OBSTETRICS & GYNECOLOGY

## 2020-11-21 PROCEDURE — 2500000003 HC RX 250 WO HCPCS: Performed by: OBSTETRICS & GYNECOLOGY

## 2020-11-21 PROCEDURE — 36415 COLL VENOUS BLD VENIPUNCTURE: CPT

## 2020-11-21 PROCEDURE — 6360000002 HC RX W HCPCS: Performed by: OBSTETRICS & GYNECOLOGY

## 2020-11-21 PROCEDURE — 6370000000 HC RX 637 (ALT 250 FOR IP): Performed by: INTERNAL MEDICINE

## 2020-11-21 PROCEDURE — 80053 COMPREHEN METABOLIC PANEL: CPT

## 2020-11-21 PROCEDURE — 6360000002 HC RX W HCPCS: Performed by: INTERNAL MEDICINE

## 2020-11-21 PROCEDURE — 84132 ASSAY OF SERUM POTASSIUM: CPT

## 2020-11-21 PROCEDURE — 6370000000 HC RX 637 (ALT 250 FOR IP): Performed by: FAMILY MEDICINE

## 2020-11-21 PROCEDURE — 81001 URINALYSIS AUTO W/SCOPE: CPT

## 2020-11-21 RX ORDER — POTASSIUM CHLORIDE 7.45 MG/ML
10 INJECTION INTRAVENOUS ONCE
Status: COMPLETED | OUTPATIENT
Start: 2020-11-21 | End: 2020-11-21

## 2020-11-21 RX ADMIN — FAMOTIDINE 20 MG: 10 INJECTION INTRAVENOUS at 09:21

## 2020-11-21 RX ADMIN — CEFTRIAXONE 1 G: 1 INJECTION, POWDER, FOR SOLUTION INTRAMUSCULAR; INTRAVENOUS at 03:48

## 2020-11-21 RX ADMIN — ACETAMINOPHEN 500 MG: 500 TABLET ORAL at 17:14

## 2020-11-21 RX ADMIN — ONDANSETRON 4 MG: 2 INJECTION INTRAMUSCULAR; INTRAVENOUS at 08:54

## 2020-11-21 RX ADMIN — SODIUM CHLORIDE, POTASSIUM CHLORIDE, SODIUM LACTATE AND CALCIUM CHLORIDE: 600; 310; 30; 20 INJECTION, SOLUTION INTRAVENOUS at 03:52

## 2020-11-21 RX ADMIN — ENOXAPARIN SODIUM 30 MG: 40 INJECTION SUBCUTANEOUS at 21:39

## 2020-11-21 RX ADMIN — CEFTRIAXONE 1 G: 1 INJECTION, POWDER, FOR SOLUTION INTRAMUSCULAR; INTRAVENOUS at 15:55

## 2020-11-21 RX ADMIN — BENZONATATE 100 MG: 100 CAPSULE ORAL at 09:10

## 2020-11-21 RX ADMIN — ONDANSETRON 4 MG: 2 INJECTION INTRAMUSCULAR; INTRAVENOUS at 03:48

## 2020-11-21 RX ADMIN — FAMOTIDINE 20 MG: 10 INJECTION INTRAVENOUS at 21:39

## 2020-11-21 RX ADMIN — PROMETHAZINE HYDROCHLORIDE 6.25 MG: 25 INJECTION INTRAMUSCULAR; INTRAVENOUS at 06:26

## 2020-11-21 RX ADMIN — POTASSIUM BICARBONATE 20 MEQ: 782 TABLET, EFFERVESCENT ORAL at 09:06

## 2020-11-21 RX ADMIN — POTASSIUM CHLORIDE 10 MEQ: 7.46 INJECTION, SOLUTION INTRAVENOUS at 08:52

## 2020-11-21 RX ADMIN — SODIUM CHLORIDE, POTASSIUM CHLORIDE, SODIUM LACTATE AND CALCIUM CHLORIDE: 600; 310; 30; 20 INJECTION, SOLUTION INTRAVENOUS at 12:13

## 2020-11-21 RX ADMIN — ACETAMINOPHEN 500 MG: 500 TABLET ORAL at 01:32

## 2020-11-21 RX ADMIN — ENOXAPARIN SODIUM 30 MG: 40 INJECTION SUBCUTANEOUS at 08:59

## 2020-11-21 RX ADMIN — BETAMETHASONE SODIUM PHOSPHATE AND BETAMETHASONE ACETATE 12 MG: 3; 3 INJECTION, SUSPENSION INTRA-ARTICULAR; INTRALESIONAL; INTRAMUSCULAR; SOFT TISSUE at 08:57

## 2020-11-21 ASSESSMENT — PAIN SCALES - GENERAL
PAINLEVEL_OUTOF10: 5
PAINLEVEL_OUTOF10: 8

## 2020-11-21 NOTE — PROGRESS NOTES
Assumed patient care. Patient states that she is fatigued and SOB. No OB complaints. VSS. Call light within reach.

## 2020-11-21 NOTE — PROGRESS NOTES
Maternal Fetal Medicine     Came by  by to see  Ms Susan Connolly   - on [facility] for  [facility] Day # 2- Resting quietly  now. Urine output still concentrated, minimal- should improve as her fluid deficit improves. Continue IVF for now    Advancing diet as tolerated - antiemetics helping    Afebrile-  BP / Pulse/RR stable  SaO2 96% + on O2 nasal cannula   No contractions reported- Baby active - Monitoring TID     Impression - clinical course improving    No Active Labor  Size = Dates    No Distress by US/BPP/Doppler /NST   No Preeclampsia - Labs improving. LFT's, urine SG still reflect dehydration    No Bleeding - No PPROM  No deterioration  of pulmonary status      Impression - Stable from OB standpoint- no call to deliver today.     Recommendation - Continue on current course  Steroids/ rocephin/ Lovenox/ GI meds/ IVF     Leon Lafleur MD

## 2020-11-21 NOTE — PROGRESS NOTES
Shift assessment as charted. Pt denies lof, vb. States occasional mild cramping. States good fetal movement. Call light in reach. Denies any needs at this time.

## 2020-11-21 NOTE — PROGRESS NOTES
Patient did not want to eat or drink anything at this time. Placed on monitor for after dinner monitoring.

## 2020-11-21 NOTE — PROGRESS NOTES
Assumed pt care. Pt lying in bed trying to fall asleep. States that she is comfortable right now and call light is within reach.

## 2020-11-21 NOTE — PROGRESS NOTES
[facility] Progress Note    Admitting Date and Time: 11/19/2020  8:58 PM  Admit Dx: 35 weeks gestation of pregnancy [Z3A.35]    Subjective:  Patient is being followed for 35 weeks gestation of pregnancy [Z3A.35]     Pt feels exhausted. Better with Oxygen on; currently on  O2 sat:  97%  on 8 L  Fever:  98.1 (36.7)    Per RN: no new concerns    ROS: denies cp, n/v, HA unless stated above.  famotidine (PEPCID) injection  20 mg Intravenous BID    enoxaparin  30 mg Subcutaneous BID    betamethasone acetate-betamethasone sodium phosphate  12 mg Intramuscular Daily    cefTRIAXone (ROCEPHIN) IV  1 g Intravenous Q12H    potassium bicarb-citric acid  20 mEq Oral BID     benzonatate, 100 mg, TID PRN  acetaminophen, 500 mg, Q4H PRN  promethazine, 6.25 mg, Q6H PRN  acetaminophen-codeine, 1 tablet, Q4H PRN  ondansetron, 4 mg, Q6H PRN         Objective:    /83   Pulse 90   Temp 98.3 °F (36.8 °C) (Oral)   Resp 16   Ht 5' 8\" (1.727 m)   Wt (!) 304 lb (137.9 kg)   LMP 03/18/2020   SpO2 97%   BMI 46.22 kg/m²     General Appearance: alert and oriented to person, place and time and in no acute distress  Skin: warm and dry  Head: normocephalic and atraumatic  Eyes: pupils equal, round, and reactive to light, extraocular eye movements intact, conjunctivae normal  Neck: neck supple and non tender without mass   Pulmonary/Chest: diminished breath sounds.     Cardiovascular: normal rate, normal S1 and S2 Abdomen: soft, non-tender, non-distended, normal bowel sounds, no masses or organomegaly  Extremities: no cyanosis, no clubbing and no edema  Neurologic: no cranial nerve deficit and speech normal        Recent Labs     11/20/20  0243 11/20/20  1330 11/21/20  0615    138 138   K 3.9 3.1* 3.1*    103 102   CO2 18* 20* 22   BUN 7 8 9   CREATININE 0.6 0.6 0.6   GLUCOSE 131* 151* 125*   CALCIUM 8.2* 8.3* 8.8       Recent Labs     11/19/20  1755 11/20/20  0243 11/20/20  1330   WBC 13.3* 10.9 9. 5   RBC 5.41 5.09 4.67   HGB 14.7 13.8 12.8   HCT 47.6 45.6 41.8   MCV 88.0 89.6 89.5   MCH 27.2 27.1 27.4   MCHC 30.9* 30.3* 30.6*   RDW 16.4* 16.2* 16.3*    290 285   MPV 10.1 10.5 10.3       Radiology:  n/a    Assessment/Plan:    Principal Problem:    Pneumonia due to COVID-19 virus  Active Problems:    Decreased fetal movements in third trimester    35 weeks gestation of pregnancy    Acute respiratory failure due to COVID-19 (HCC)    Elevated liver enzymes    Proteinuria    COVID-19  Resolved Problems:    * No resolved hospital problems. *35 weeks gestation of pregnancy  On Monitor of Mom & baby's heart rate. Supportive care. Plan delivery when appropriate. Consult with .       Acute respiratory failure due to COVID-19 (HCC)  Continue Hi-Flow oxygen. Inhalers and steroids. SW and Case Management inquiring on return to facility. most likely due to viral pneumonia/COVID 19; O2 sat was below 90% on room air, requiring 8L nasal cannula and oxygen saturation above 90%. Treating the underlying process.        Elevated liver enzymes, improving  2/2 to hypovolemia. Monitor and avoid meds, alcohol and toxins which can affect the liver and its daily functions.         Proteinuria  Supportive Care. Continue monitoring daily.      Acute viral pneumonia, respiratory panel is not ordered procalcitonin was 0.15, CAT scan of the chest showed bilateral patchy groundglass opacities, sent for COVID-19, abnormal test.    Electronically signed by Cristina Andrade MD on 11/21/2020 at 6:03 PM

## 2020-11-21 NOTE — CONSULTS
[facility]   [address] [place] [address].  [address] [place] [address]. [id number]  Ph: [phone]    Fax: [phone]  November 20,2020     Jesus Sheikh [birth year]  Dear Dr. Giovanna Galdamez : We saw  Ms. Laquita Morales    for consultation and ultrasound  on the Antepartum Unit at [facility] in [place], [place]  on  11/20/2020. [facility] REASON FOR CONSULTATION: 31yo WF [id number] @ 35w2d EGA admitted with Pneumonia/ COVID -19 . Symptomatic for several days- Admitted last night through ER    High risk pregnancy o09.89   Obesity o99.21   COVID -19 pneumonia U07.1   Dehydration E86.0   Emesis /Pregnancy o21.9   Anxiety F41.9     Her chart was reviewed, her medical, surgical , and OBG history was examined along with any supporting documents available to me .  GB surgery, breast reduction-bilateral    Her recent clinical visits and notes were reviewed.  Buspar 5mg PO BID    Her recent laboratory and pathology  testing was reviewed (see below)   Review of Systems :    CONSTITUTIONAL: No fever, no chills , aches, pain    HEENT:   +headache, no visual changes, +  sore throat    PULM: +/- dyspnea, no cough during visit    CARDIO:  No chest pains, no palpitations   GI:  ++Nausea, recent emesis ; Hx of GERD / post GB  symptoms   o no recent  diarrhea, no abdominal pain     : No dysuria, no vaginal bleeding or fluid leaking or discharge    She reports good fetal movement      PHYSICAL EXAMINATION: VSS- Afebrile Pulse 100 /86   BMI 47   General Appearance: Ill appearing , tired but responsive Donivan Bear  /oriented on interview , moderate  distress.  Eyes: No pallor, no icterus, no photophobia.  Back: No CVA tenderness.  Abdomen: Soft, Uterus non-tender.  Uterus- Tone WNL. No uncomfortable contractions    Extremities: 2+ pretibial pitting edema    LAB  Results for Rogerio Chow \"MO\" (MRN [id number]) as of 11/20/2020 23:51   Ref.  Range 11/20/2020 13:30   Sodium Latest Ref Range: 132 - 146 mmol/L 138   Potassium Latest Ref Range: 3.5 - 5.0 mmol/L 3.1 (L)   Chloride Latest Ref Range: 98 - 107 mmol/L 103   CO2 Latest Ref Range: 22 - 29 mmol/L 20 (L)   BUN Latest Ref Range: 6 - 20 mg/dL 8   Creatinine Latest Ref Range: 0.5 - 1.0 mg/dL 0.6   Anion Gap Latest Ref Range: 7 - 16 mmol/L 15   GFR Non- Latest Ref Range: >=60 mL/min/1.73 >60   GFR African American Unknown >60   Glucose Latest Ref Range: 74 - 99 mg/dL 151 (H)   Calcium Latest Ref Range: 8.6 - 10.2 mg/dL 8.3 (L)   Total Protein Latest Ref Range: 6.4 - 8.3 g/dL 6.3 (L)   Procalcitonin Latest Ref Range: 0.00 - 0.08 ng/mL 0.15 (H)   CRP Latest Ref Range: 0.0 - 0.4 mg/dL 7.5 (H)   Troponin Latest Ref Range: 0.00 - 0.03 ng/mL <0.01   Albumin Latest Ref Range: 3.5 - 5.2 g/dL 2.7 (L)   Alk Phos Latest Ref Range: 35 - 104 U/L 180 (H)   ALT Latest Ref Range: 0 - 32 U/L 44 (H)   AST Latest Ref Range: 0 - 31 U/L 51 (H)   Bilirubin Latest Ref Range: 0.0 - 1.2 mg/dL 1.8 (H)   WBC Latest Ref Range: 4.5 - 11.5 E9/L 9.5   RBC Latest Ref Range: 3.50 - 5.50 E12/L 4.67   Hemoglobin Quant Latest Ref Range: 11.5 - 15.5 g/dL 12.8   Hematocrit Latest Ref Range: 34.0 - 48.0 % 41.8   MCV Latest Ref Range: 80.0 - 99.9 fL 89.5   MCH Latest Ref Range: 26.0 - 35.0 pg 27.4   MCHC Latest Ref Range: 32.0 - 34.5 % 30.6 (L)   MPV Latest Ref Range: 7.0 - 12.0 fL 10.3   RDW Latest Ref Range: 11.5 - 15.0 fL 16.3 (H)   Platelet Count Latest Ref Range: 130 - 450 E9/L 285   Neutrophils % Latest Ref Range: 43.0 - 80.0 % 90.4 (H)   Lymphocyte % Latest Ref Range: 20.0 - 42.0 % 7.8 (L)   Monocytes % Latest Ref Range: 2.0 - 12.0 % 1.8 (L)   Eosinophils % Latest Ref Range: 0.0 - 6.0 % 0.0   Basophils % Latest Ref Range: 0.0 - 2.0 % 0.0   Neutrophils Absolute Latest Ref Range: 1.80 - 7.30 E9/L 8.55 (H)   Lymphocytes Absolute Latest Ref Range: 1.50 - 4.00 E9/L 0.76 (L)   Monocytes Absolute Latest Ref Range: 0.10 - 0.95 E9/L 0.19   Eosinophils Absolute Latest Ref Range: 0.05 - 0.50 E9/L 0.00 (L)   Basophils Absolute Latest Ref Range: 0.00 - 0.20 E9/L 0.00   Nucleated Red Blood Cells Latest Units: /100 WBC 0.0   Poikilocytes Unknown 1+   Polychromasia Unknown 1+   Neeses Cells Unknown 1+   D-Dimer, Quant Latest Units: ng/mL    Color, UA Latest Ref Range: Straw/Yellow  ORANGE (A)   Clarity, UA Latest Ref Range: Clear  TURBID (A)   Glucose, UA Latest Ref Range: Negative mg/dL 100 (A)   Bilirubin, Urine Latest Ref Range: Negative  LARGE (A)   Ketones, Urine Latest Ref Range: Negative mg/dL >=80 (A)   Specific [place], UA Latest Ref Range: 1.005 - 1.030  >=1.030   Blood, Urine Latest Ref Range: Negative  MODERATE (A)   pH, UA Latest Ref Range: 5.0 - 9.0  6.5   Protein, UA Latest Ref Range: Negative mg/dL 100 (A)   Urobilinogen, Urine Latest Ref Range: <2.0 E.U./dL 4.0 (A)   Nitrite, Urine Latest Ref Range: Negative  POSITIVE (A)   Leukocyte Esterase, Urine Latest Ref Range: Negative  Negative   WBC, UA Latest Ref Range: 0 - 5 /HPF 1-3   RBC, UA Latest Ref Range: 0 - 2 /HPF 5-10 (A)   Bacteria, UA Latest Ref Range: None Seen /HPF MANY (A)   Amorphous, UA Unknown MANY        An ultrasound evaluation was done today. Please refer to the enclosed copy of the ultrasound report for further detailed information. ULTRASOUND IMPRESSION:     Vertex fetus @  35w2d  ( wants baby gender to be a surprise)   [facility] The fetus is measuring appropriate for gestational age.  2443g (5:6)  46%ile for EGA .  Active, responsive baby. The amniotic fluid volume appears generous/ normal.   ? Fetal anatomy was briefly  reviewed and appears normal.  ? Transabdominal views of cervix appears to be closed ,long, without significant funneling upon Valsalva.  The biophysical profile is reassuring with a score of 8/8.  Umbilical artery Doppler studies are reassuring with good end-diastolic flow.    Monitor strip reviewed- Class 1 .    Fetal Heart Rate Baseline 140 bpm.    Periodic Changes - Accelerations Present, No recent decelerations noted over past several hours Noted + beat to beat variability  ~Pt notes fetal movement, no cramping or contraction. No undue tenderness or distress during exam.   Overall favorable ultrasound report today. Impression :    Improving status - SaO2 >95% on room air, RR- 18-20. No active coughing during visit. N/A  getting better, dehydration improving with IVF/ antiemetics and ^ PO fluid intake  Diarrhea not a current problem- may return  Afebrile- BP stable  Not in labor  Baby not in distress  Able to sit up and eat this evening  LFTs mildly elevated- likely due to emesis p 48 hrs. Not escalating  Unlikely a developing preeclampsia /HELLP picture on top of COVID-  but will follow labs. RECOMMENDATIONS:  1) Reviewed findings and recommendations with referring physician   2) Agree with IM recommendations / orders for betamethasone/ IV dexamethasone. 3) Agree with pharmacy recommendations for Lovenox dosing @ 40 . a)  If it looks as if she may need to deliver , change to unfractionated heparin BID @ 10K U BID- more easily reversed for delivery. b) SCDs for bedrest as tolerated   4) Intake and output of fluids should be carefully monitored in these women, and aggressive hydration should be avoided since it can lead to pulmonary edema and worsen maternal oxygenation that may already be compromised   5) Once her dehydration is addressed , Total fluids <100 cc/hour is reasonable. as long as hypotension and organ hypoperfusion can be avoided. ( maintain adequate renal output )   a) Strict cumulative I/Os  discussed with nursing   6) Should she become preeclamptic AND have fluid overload issues, alternate seizure prophylaxis ( Dilantin/ Ativan ) may be preferable to MagS04  7) Should she need delivery ( if her O2 status starts to worsen, OB indicators such as PIH, etc) it may be prudent to deliver sooner in this course rather than later.  ( her respiratory status may improve enough with a delivered uterus to avoid need for intubation, etc.)  a) There is some evidence that suggest that C/S may carry an extra  added risk of clinical deterioration with COVID, particularly in a patient her size , abnormal CXR,  fluid/cardiopulmonary status, etc.   8) Plan A may be to proceed with active management and hopes for a vaginal delivery  9)   if needed- expedite mechanical + oxytocin/ PGE cervical ripening, etc.   a) Labor analgesia and anesthesia -- In patients with known or suspected COVID-19, neuraxial anesthetic is not contraindicated and has several advantages in laboring patients: it provides good analgesia and thus reduces cardiopulmonary stress from pain and anxiety and, in turn, the chance of viral dissemination, and it is available in case an emergency  is required, thus obviating the need for general anesthesia. 10) Precautions for PPE in delivery room as per protocol-  11) Plans/ recommendations  for skin to skin/ breastfeeding ,  housing and feeding etc are available . Will follow closely with you- hopefully she will recover and continue on with her pregnancy for another few weeks in better health-       Once again, thank you for allowing us to participate in the care of this patient and if we can be of any further assistance to you, please do not hesitate to contact us. Sincerely,  Crista Stallings MD    I was present and directed the ultrasound exam , and spent 41 minutes with the patient of which greater than 50% of the time was used to  the patient, discuss complications and problems related to her pregnancy, or coordinating her care. I answered all of her questions to her satisfaction.

## 2020-11-22 LAB
ALBUMIN SERPL-MCNC: 2.6 G/DL (ref 3.5–5.2)
ALP BLD-CCNC: 179 U/L (ref 35–104)
ALT SERPL-CCNC: 108 U/L (ref 0–32)
ANION GAP SERPL CALCULATED.3IONS-SCNC: 10 MMOL/L (ref 7–16)
AST SERPL-CCNC: 124 U/L (ref 0–31)
BILIRUB SERPL-MCNC: 0.6 MG/DL (ref 0–1.2)
BUN BLDV-MCNC: 5 MG/DL (ref 6–20)
C-REACTIVE PROTEIN: 0.9 MG/DL (ref 0–0.4)
CALCIUM SERPL-MCNC: 8.6 MG/DL (ref 8.6–10.2)
CHLORIDE BLD-SCNC: 103 MMOL/L (ref 98–107)
CO2: 26 MMOL/L (ref 22–29)
CREAT SERPL-MCNC: 0.5 MG/DL (ref 0.5–1)
D DIMER: 306 NG/ML DDU
FERRITIN: 71 NG/ML
FIBRINOGEN: >700 MG/DL (ref 225–540)
GFR AFRICAN AMERICAN: >60
GFR NON-AFRICAN AMERICAN: >60 ML/MIN/1.73
GLUCOSE BLD-MCNC: 114 MG/DL (ref 74–99)
HCT VFR BLD CALC: 37.3 % (ref 34–48)
HEMOGLOBIN: 11.6 G/DL (ref 11.5–15.5)
INR BLD: 1.5
MAGNESIUM: 2 MG/DL (ref 1.6–2.6)
MCH RBC QN AUTO: 27.7 PG (ref 26–35)
MCHC RBC AUTO-ENTMCNC: 31.1 % (ref 32–34.5)
MCV RBC AUTO: 89 FL (ref 80–99.9)
PDW BLD-RTO: 16.1 FL (ref 11.5–15)
PLATELET # BLD: 327 E9/L (ref 130–450)
PMV BLD AUTO: 10.2 FL (ref 7–12)
POTASSIUM REFLEX MAGNESIUM: 3.3 MMOL/L (ref 3.5–5)
POTASSIUM SERPL-SCNC: 3.3 MMOL/L (ref 3.5–5)
PROTHROMBIN TIME: 17.4 SEC (ref 9.3–12.4)
RBC # BLD: 4.19 E12/L (ref 3.5–5.5)
SODIUM BLD-SCNC: 139 MMOL/L (ref 132–146)
TOTAL CK: 111 U/L (ref 20–180)
TOTAL PROTEIN: 5.9 G/DL (ref 6.4–8.3)
WBC # BLD: 8.4 E9/L (ref 4.5–11.5)

## 2020-11-22 PROCEDURE — 76820 UMBILICAL ARTERY ECHO: CPT | Performed by: OBSTETRICS & GYNECOLOGY

## 2020-11-22 PROCEDURE — 6360000002 HC RX W HCPCS: Performed by: INTERNAL MEDICINE

## 2020-11-22 PROCEDURE — 85378 FIBRIN DEGRADE SEMIQUANT: CPT

## 2020-11-22 PROCEDURE — 36415 COLL VENOUS BLD VENIPUNCTURE: CPT

## 2020-11-22 PROCEDURE — 1220000000 HC SEMI PRIVATE OB R&B

## 2020-11-22 PROCEDURE — 2500000003 HC RX 250 WO HCPCS: Performed by: OBSTETRICS & GYNECOLOGY

## 2020-11-22 PROCEDURE — 6370000000 HC RX 637 (ALT 250 FOR IP): Performed by: FAMILY MEDICINE

## 2020-11-22 PROCEDURE — 76821 MIDDLE CEREBRAL ARTERY ECHO: CPT | Performed by: OBSTETRICS & GYNECOLOGY

## 2020-11-22 PROCEDURE — 6360000002 HC RX W HCPCS: Performed by: OBSTETRICS & GYNECOLOGY

## 2020-11-22 PROCEDURE — 85384 FIBRINOGEN ACTIVITY: CPT

## 2020-11-22 PROCEDURE — 83735 ASSAY OF MAGNESIUM: CPT

## 2020-11-22 PROCEDURE — 6370000000 HC RX 637 (ALT 250 FOR IP): Performed by: INTERNAL MEDICINE

## 2020-11-22 PROCEDURE — 99223 1ST HOSP IP/OBS HIGH 75: CPT | Performed by: FAMILY MEDICINE

## 2020-11-22 PROCEDURE — 83520 IMMUNOASSAY QUANT NOS NONAB: CPT

## 2020-11-22 PROCEDURE — 76821 MIDDLE CEREBRAL ARTERY ECHO: CPT

## 2020-11-22 PROCEDURE — 80053 COMPREHEN METABOLIC PANEL: CPT

## 2020-11-22 PROCEDURE — 82550 ASSAY OF CK (CPK): CPT

## 2020-11-22 PROCEDURE — 76819 FETAL BIOPHYS PROFIL W/O NST: CPT

## 2020-11-22 PROCEDURE — 85610 PROTHROMBIN TIME: CPT

## 2020-11-22 PROCEDURE — 85027 COMPLETE CBC AUTOMATED: CPT

## 2020-11-22 PROCEDURE — 2580000003 HC RX 258: Performed by: OBSTETRICS & GYNECOLOGY

## 2020-11-22 PROCEDURE — 76819 FETAL BIOPHYS PROFIL W/O NST: CPT | Performed by: OBSTETRICS & GYNECOLOGY

## 2020-11-22 PROCEDURE — 86140 C-REACTIVE PROTEIN: CPT

## 2020-11-22 PROCEDURE — 76820 UMBILICAL ARTERY ECHO: CPT

## 2020-11-22 PROCEDURE — 82728 ASSAY OF FERRITIN: CPT

## 2020-11-22 PROCEDURE — 2700000000 HC OXYGEN THERAPY PER DAY

## 2020-11-22 PROCEDURE — 76816 OB US FOLLOW-UP PER FETUS: CPT

## 2020-11-22 PROCEDURE — 99232 SBSQ HOSP IP/OBS MODERATE 35: CPT | Performed by: OBSTETRICS & GYNECOLOGY

## 2020-11-22 RX ADMIN — ONDANSETRON 4 MG: 2 INJECTION INTRAMUSCULAR; INTRAVENOUS at 21:01

## 2020-11-22 RX ADMIN — CEFTRIAXONE 1 G: 1 INJECTION, POWDER, FOR SOLUTION INTRAMUSCULAR; INTRAVENOUS at 04:20

## 2020-11-22 RX ADMIN — FAMOTIDINE 20 MG: 10 INJECTION INTRAVENOUS at 21:01

## 2020-11-22 RX ADMIN — BENZONATATE 100 MG: 100 CAPSULE ORAL at 15:39

## 2020-11-22 RX ADMIN — CEFTRIAXONE 1 G: 1 INJECTION, POWDER, FOR SOLUTION INTRAMUSCULAR; INTRAVENOUS at 15:39

## 2020-11-22 RX ADMIN — POTASSIUM BICARBONATE 20 MEQ: 782 TABLET, EFFERVESCENT ORAL at 08:30

## 2020-11-22 RX ADMIN — ENOXAPARIN SODIUM 30 MG: 40 INJECTION SUBCUTANEOUS at 08:30

## 2020-11-22 RX ADMIN — FAMOTIDINE 20 MG: 10 INJECTION INTRAVENOUS at 08:31

## 2020-11-22 RX ADMIN — ENOXAPARIN SODIUM 30 MG: 40 INJECTION SUBCUTANEOUS at 21:01

## 2020-11-22 RX ADMIN — ACETAMINOPHEN 500 MG: 500 TABLET ORAL at 06:18

## 2020-11-22 RX ADMIN — ACETAMINOPHEN 500 MG: 500 TABLET ORAL at 21:01

## 2020-11-22 RX ADMIN — BENZONATATE 100 MG: 100 CAPSULE ORAL at 20:30

## 2020-11-22 RX ADMIN — SODIUM CHLORIDE, POTASSIUM CHLORIDE, SODIUM LACTATE AND CALCIUM CHLORIDE: 600; 310; 30; 20 INJECTION, SOLUTION INTRAVENOUS at 15:39

## 2020-11-22 ASSESSMENT — PAIN SCALES - GENERAL
PAINLEVEL_OUTOF10: 7
PAINLEVEL_OUTOF10: 7

## 2020-11-22 ASSESSMENT — PAIN DESCRIPTION - DESCRIPTORS: DESCRIPTORS: CONSTANT

## 2020-11-22 NOTE — PROGRESS NOTES
[facility] Progress Note    Admitting Date and Time: 11/19/2020  8:58 PM  Admit Dx: 35 weeks gestation of pregnancy [Z3A.35]    Subjective:  Patient is being followed for 35 weeks gestation of pregnancy [Z3A.35]     Pt feels that SOB got worse. O2 increased to 15 L via Nasal cannula    O2 sat:  97%  on  15 L of NC    Fever:  98.2    Per RN: deterioration in O2 sat rapidly as got up with assistance to use the bathroom    ROS: denies cp, n/v, HA unless stated above.  famotidine (PEPCID) injection  20 mg Intravenous BID    enoxaparin  30 mg Subcutaneous BID    cefTRIAXone (ROCEPHIN) IV  1 g Intravenous Q12H    potassium bicarb-citric acid  20 mEq Oral BID     benzonatate, 100 mg, TID PRN  acetaminophen, 500 mg, Q4H PRN  promethazine, 6.25 mg, Q6H PRN  acetaminophen-codeine, 1 tablet, Q4H PRN  ondansetron, 4 mg, Q6H PRN         Objective:    /88   Pulse 90   Temp 98.2 °F (36.8 °C) (Oral)   Resp 18   Ht 5' 8\" (1.727 m)   Wt (!) 304 lb (137.9 kg)   LMP 03/18/2020   SpO2 98%   BMI 46.22 kg/m²     General Appearance: alert and oriented to person, place and time and in no acute distress. Resting in bed. Respirations are without retractions or nasal flaring. Talks w/o SOB.     Skin: warm and dry  Head: normocephalic and atraumatic  Eyes: pupils equal, round, and reactive to light, extraocular eye movements intact, conjunctivae normal  Neck: neck supple and non tender without mass   Pulmonary/Chest: crackles bilaterally- no wheezes, mild respiratory distress  Cardiovascular: normal rate, normal S1 and S2 and no carotid bruits  Abdomen: soft, non-tender, non-distended, normal bowel sounds, no masses or organomegaly  Extremities: no cyanosis, no clubbing and no edema  Neurologic: no cranial nerve deficit and speech normal        Recent Labs     11/20/20  1330 11/21/20  0615 11/21/20  1800 11/22/20  0610    138  --  139   K 3.1* 3.1* 3.7 3.3*  3.3*    102  --  103   CO2 20* 22  --  26   BUN 8 9  --  5*   CREATININE 0.6 0.6  --  0.5   GLUCOSE 151* 125*  --  114*   CALCIUM 8.3* 8.8  --  8.6       Recent Labs     11/20/20  0243 11/20/20  1330 11/22/20  0610   WBC 10.9 9.5 8.4   RBC 5.09 4.67 4.19   HGB 13.8 12.8 11.6   HCT 45.6 41.8 37.3   MCV 89.6 89.5 89.0   MCH 27.1 27.4 27.7   MCHC 30.3* 30.6* 31.1*   RDW 16.2* 16.3* 16.1*    285 327   MPV 10.5 10.3 10.2       Radiology: n/a    Assessment:    Principal Problem:    Pneumonia due to COVID-19 virus  Active Problems:    Decreased fetal movements in third trimester    35 weeks gestation of pregnancy    Acute respiratory failure due to COVID-19 (HCC)    Elevated liver enzymes    Proteinuria    COVID-19  Resolved Problems:    * No resolved hospital problems. *      Plan:    Acute hypoxic respiratory failure, most likely due to viral pneumonia, O2 sat was below 90% on room air, requiring 15L nasal cannula  and oxygen saturation above 90%. Treating the underlying process. Acute viral pneumonia, rapid influenza a and B were negative, respiratory panel was not ordered, procalcitonin was 0.15, CAT scan of the chest showed bilateral patchy groundglass opacities, sent for COVID-19, currently still pending. Acute respiratory failure due to COVID-19 (HCC)  Continue Hi-Flow oxygen.  Inhalers and steroids.  SW and Case Management inquiring on return to facility. most likely due to viral pneumonia/COVID 19; O2 sat was below 90% on room air, requiring 8L nasal cannula and oxygen saturation above 90%. Treating the underlying process.        Elevated liver enzymes, improving  2/2 to hypovolemia.  Monitor and avoid meds, alcohol and toxins which can affect the liver and its daily functions.         Proteinuria  Supportive Care.  Continue monitoring daily.      Acute viral pneumonia, respiratory panel is not ordered procalcitonin was 0.15, CAT scan of the chest showed bilateral patchy groundglass opacities, sent for COVID-19, abnormal test.     Electronically signed by Heather Patel MD on 11/22/2020 at 9:34 AM

## 2020-11-22 NOTE — PROGRESS NOTES
At bedside for rounds. Patient resting. Denies LOF, VB, CTX's/pain. +FM. No OB complaints at this time. Patient voices no other complaints at this time. Call light within reach.

## 2020-11-22 NOTE — PROGRESS NOTES
RN at bedside at 0830. Patient comfortably sitting up in bed, no complaints of SOB at this time. NC O2 at 5L, oxygen saturation 98%. Patient denies LOF, VB, or ctx; states +FM. NO OB complaints at this time. Patient educated on the importance of K medication, patient able to take full K tablet this morning. Patient expressed it tastes better in grape juice. At approximately 0840, patient briefly stood up to rearrange and straighten out bed linen. NC O2 at 5L, oxygen saturation dropped to 81%. Patient expressed feeling SOB.  Once patient was back in bed and resting comfortably, SpO2 97% with 5L NC.

## 2020-11-22 NOTE — PROGRESS NOTES
Pt placed on EFM for after lunch monitoring, pt states good fetal movement, denies LOF or vaginal bleeding or contractions.

## 2020-11-22 NOTE — PROGRESS NOTES
[facility]                       [address] [place] [address].  [address] [place] [address]. [address]  Ph: [phone]    Fax: [phone]  November 22, 2020      Vinay Curran [birth year]  Dear Dr. Kylie Johnson : We saw  Ms. Nathalie Wang  for consultation and ultrasound on the Antepartum Unit at [facility] in [place], [place]  on  11/ 22 /2020. [place] REASON FOR CONSULTATION: 33yo WF [id number] @ 35w4d EGA admitted with Pneumonia/ COVID -19 . Symptomatic for several days- Admitted last night through ER   · High risk pregnancy o09.89  · Obesity o99.21  · COVID -19 pneumonia U07.1  · Dehydration E86.0  · Emesis /Pregnancy o21.9  · Anxiety F41. 9     · Her chart was reviewed, her medical, surgical , and OBG history was examined along with any supporting documents available to me . · GB surgery, breast reduction-bilateral   · Her recent clinical visits and notes were reviewed. · Buspar 5mg PO BID   · Her recent laboratory and pathology  testing was reviewed (see below)   Review of Systems :   · CONSTITUTIONAL: No fever, no chills , aches, pain - much better  · HEENT:  headache - resolved , no visual changes,   · PULM: less  dyspnea, no cough during visit   · CARDIO:  No chest pains, no palpitations  · GI:  Improving Nausea,  No recent emesis ;   ·    Hx of GERD / post GB  symptoms   ? no recent  diarrhea, no abdominal pain   ·  : No dysuria, no vaginal bleeding or fluid leaking or discharge   · Leaking around Gaspar cath with cough  · She reports good fetal movement   ·    PHYSICAL EXAMINATION: VSS- Afebrile Pulse 87  /84  · BMI 47   · SaO2  > 97% on 5L O2 NC   · General Appearance: Ill appearing , tired but responsive Nikki Mould  /oriented on interview , moderate  distress. · Eyes: No pallor, no icterus, no photophobia. · Back: No CVA tenderness. · Abdomen: Soft, Uterus non-tender. · Uterus- Tone WNL.  No uncomfortable contractions   · Extremities: 2+ pretibial pitting edema     LAB  Results for Israel Menendez \"MO\" (MRN [id number]) as of 11/20/2020 23:51   Results for Aisha Manual \"MO\" (MRN [id number]) as of 11/22/2020 17:27   Ref. Range 11/22/2020 06:10   Sodium Latest Ref Range: 132 - 146 mmol/L 139   Potassium Latest Ref Range: 3.5 - 5.0 mmol/L 3.3 (L)   Chloride Latest Ref Range: 98 - 107 mmol/L 103   CO2 Latest Ref Range: 22 - 29 mmol/L 26   BUN Latest Ref Range: 6 - 20 mg/dL 5 (L)   Creatinine Latest Ref Range: 0.5 - 1.0 mg/dL 0.5   Anion Gap Latest Ref Range: 7 - 16 mmol/L 10   GFR Non- Latest Ref Range: >=60 mL/min/1.73 >60   GFR African American Unknown >60   Magnesium Latest Ref Range: 1.6 - 2.6 mg/dL 2.0   Glucose Latest Ref Range: 74 - 99 mg/dL 114 (H)   Calcium Latest Ref Range: 8.6 - 10.2 mg/dL 8.6   Total Protein Latest Ref Range: 6.4 - 8.3 g/dL 5.9 (L)   Total CK Latest Ref Range: 20 - 180 U/L 111   CRP Latest Ref Range: 0.0 - 0.4 mg/dL 0.9 (H)   Albumin Latest Ref Range: 3.5 - 5.2 g/dL 2.6 (L)   Alk Phos Latest Ref Range: 35 - 104 U/L 179 (H)   ALT Latest Ref Range: 0 - 32 U/L 108 (H)   AST Latest Ref Range: 0 - 31 U/L 124 (H)   Bilirubin Latest Ref Range: 0.0 - 1.2 mg/dL 0.6   WBC Latest Ref Range: 4.5 - 11.5 E9/L 8.4   RBC Latest Ref Range: 3.50 - 5.50 E12/L 4.19   Hemoglobin Quant Latest Ref Range: 11.5 - 15.5 g/dL 11.6   Hematocrit Latest Ref Range: 34.0 - 48.0 % 37.3   MCV Latest Ref Range: 80.0 - 99.9 fL 89.0   MCH Latest Ref Range: 26.0 - 35.0 pg 27.7   MCHC Latest Ref Range: 32.0 - 34.5 % 31.1 (L)   MPV Latest Ref Range: 7.0 - 12.0 fL 10.2   RDW Latest Ref Range: 11.5 - 15.0 fL 16.1 (H)   Platelet Count Latest Ref Range: 130 - 450 E9/L 327   Prothrombin Time Latest Ref Range: 9.3 - 12.4 sec 17.4 (H)   INR Unknown 1.5   Fibrinogen Latest Ref Range: 225 - 540 mg/dL >700 (H)   D-Dimer, Quant Latest Units: ng/mL  ·   LFT's elevated- no other signs of HELLP. · Cr WnL-  · Urine output improved. · LFT's ^^;  Alb (L), PT/INR prolonged -these are reflective of the hepatic inflammatory/ functional effects of COVID      An ultrasound evaluation was done today. Please refer to the enclosed copy of the ultrasound report for further detailed information. ULTRASOUND IMPRESSION:    · Vertex fetus @  35w4d  ( wants baby gender to be a surprise)   · Active, responsive baby. The amniotic fluid volume appears generous/ normal.   ? Fetal anatomy was briefly  reviewed and appears normal.  ? Transabdominal views of cervix appears to be closed ,long, without significant funneling upon Valsalva. · The biophysical profile is reassuring with a score of 8/8. · Umbilical artery Doppler studies are reassuring with good end-diastolic flow. · Middle cerebral artery shows appropriate/ expected brain sparing flow/  · Cerebroplacental ratio shifted as well- (<1.0)   · These findings are consistent with maternal lowered oxygen tension/ perfusion status. · In perspective, these changes are appropriate and effective adaptations that are working for the fetus. · ,Monitor strip reviewed- Class 1 .   · Fetal Heart Rate Baseline 140 bpm.   · Periodic Changes - Accelerations Present,   No recent decelerations noted over past several hours Noted + beat to beat variability  ~Pt notes fetal movement, no cramping or contraction. No undue tenderness or distress during exam.   Overall favorable ultrasound report today.     Impression :  much more alert and responsive   Improving status - SaO2 >97% on NC O2   , RR- 18-20. No active coughing during visit. N/A  getting better, dehydration improving with IVF/ antiemetics and ^ PO fluid intake  Diarrhea not a current problem- may return  Afebrile- BP stable  Not in labor  Baby not in distress  Able to sit up and eat this evening  LFTs mildly elevated-   Reports worldwide re: Covid in pregnancy have noted marked elevated transaminases, shift in protein production, osmotic/ edema changes- with or without superimposed preeclampsia.  Outcomes have been minimal with no liver/ renal /CNS/pulmonary damage---> terrible, as with non-pregnant Cvod -19 patients. As her blood pressures have been in decent range, her neuro exam /reflexes/ sensorium are intact, her urine output is improving and her ultrasound/ fetal dopplers /uterine artery dopplers suggest a stressed but nor DIStressed baby. Unlikely a developing preeclampsia /HELLP picture on top of COVID-  but will follow labs.        1. RECOMMENDATIONS: (Discussed planning as below with patient today at length)   2. Reviewed findings and recommendations with referring physician   3. Agree with IM recommendations / orders for betamethasone/ IV dexamethasone. 4. Agree with pharmacy recommendations for Lovenox dosing @ 40 .  a. If it looks as if she may need to deliver , change to unfractionated heparin BID @ 10K U BID- more easily reversed for delivery. b. Sneha Miners for bedrest as tolerated   5. Intake and output of fluids should be carefully monitored in these women, and aggressive hydration should be avoided since it can lead to pulmonary edema and worsen maternal oxygenation that may already be compromised   6. Once her dehydration is addressed , Total fluids <100 cc/hour is reasonable. as long as hypotension and organ hypoperfusion can be avoided. ( maintain adequate renal output )   a. Strict cumulative I/Os  discussed with nursing   7. Should she become preeclamptic AND have fluid overload issues, alternate seizure prophylaxis ( Dilantin/ Ativan ) may be preferable to MagS04  8. Should she need delivery ( if her O2 status starts to worsen, OB indicators such as PIH, etc) it may be prudent to deliver sooner in this course rather than later. ( her respiratory status may improve enough with a delivered uterus to avoid need for intubation, etc.)  a.  There is some evidence that suggest that C/S may carry an extra  added risk of clinical deterioration with COVID, particularly in a patient her size , abnormal CXR, fluid/cardiopulmonary status, etc.   9. Plan A may be to proceed with active management and hopes for a vaginal delivery  10.             if needed- expedite mechanical + oxytocin/ PGE cervical ripening, etc.   a. Labor analgesia and anesthesia -- In patients with known or suspected COVID-19, neuraxial anesthetic is not contraindicated and has several advantages in laboring patients: it provides good analgesia and thus reduces cardiopulmonary stress from pain and anxiety and, in turn, the chance of viral dissemination, and it is available in case an emergency  is required, thus obviating the need for general anesthesia. 11. Precautions for PPE in delivery room as per protocol-  12. Plans/ recommendations  for skin to skin/ breastfeeding ,  housing and feeding etc are available .      Will follow closely with you- hopefully she will recover and continue on with her pregnancy for another few weeks in better health-         Once again, thank you for allowing us to participate in the care of this patient and if we can be of any further assistance to you, please do not hesitate to contact us. Sincerely,  Mervin Obrien MD     I was present and directed the ultrasound exam , and spent 41 minutes with the patient of which greater than 50% of the time was used to  the patient, discuss complications and problems related to her pregnancy, or coordinating her care.  I answered all of her questions to her satisfaction.

## 2020-11-23 VITALS
BODY MASS INDEX: 44.41 KG/M2 | RESPIRATION RATE: 16 BRPM | WEIGHT: 293 LBS | HEART RATE: 86 BPM | TEMPERATURE: 97.7 F | SYSTOLIC BLOOD PRESSURE: 124 MMHG | OXYGEN SATURATION: 97 % | HEIGHT: 68 IN | DIASTOLIC BLOOD PRESSURE: 62 MMHG

## 2020-11-23 LAB
ALBUMIN SERPL-MCNC: 2.7 G/DL (ref 3.5–5.2)
ALP BLD-CCNC: 173 U/L (ref 35–104)
ALT SERPL-CCNC: 106 U/L (ref 0–32)
ANION GAP SERPL CALCULATED.3IONS-SCNC: 10 MMOL/L (ref 7–16)
AST SERPL-CCNC: 85 U/L (ref 0–31)
BILIRUB SERPL-MCNC: 0.7 MG/DL (ref 0–1.2)
BUN BLDV-MCNC: 4 MG/DL (ref 6–20)
C-REACTIVE PROTEIN: 0.4 MG/DL (ref 0–0.4)
CALCIUM SERPL-MCNC: 8.8 MG/DL (ref 8.6–10.2)
CHLORIDE BLD-SCNC: 102 MMOL/L (ref 98–107)
CO2: 28 MMOL/L (ref 22–29)
CREAT SERPL-MCNC: 0.6 MG/DL (ref 0.5–1)
GFR AFRICAN AMERICAN: >60
GFR NON-AFRICAN AMERICAN: >60 ML/MIN/1.73
GLUCOSE BLD-MCNC: 106 MG/DL (ref 74–99)
POTASSIUM SERPL-SCNC: 3.3 MMOL/L (ref 3.5–5)
SODIUM BLD-SCNC: 140 MMOL/L (ref 132–146)
TOTAL PROTEIN: 6.1 G/DL (ref 6.4–8.3)

## 2020-11-23 PROCEDURE — 99232 SBSQ HOSP IP/OBS MODERATE 35: CPT | Performed by: INTERNAL MEDICINE

## 2020-11-23 PROCEDURE — 80053 COMPREHEN METABOLIC PANEL: CPT

## 2020-11-23 PROCEDURE — 36415 COLL VENOUS BLD VENIPUNCTURE: CPT

## 2020-11-23 PROCEDURE — 2580000003 HC RX 258: Performed by: OBSTETRICS & GYNECOLOGY

## 2020-11-23 PROCEDURE — 86140 C-REACTIVE PROTEIN: CPT

## 2020-11-23 PROCEDURE — 6370000000 HC RX 637 (ALT 250 FOR IP): Performed by: OBSTETRICS & GYNECOLOGY

## 2020-11-23 PROCEDURE — 2500000003 HC RX 250 WO HCPCS: Performed by: OBSTETRICS & GYNECOLOGY

## 2020-11-23 PROCEDURE — 6360000002 HC RX W HCPCS: Performed by: OBSTETRICS & GYNECOLOGY

## 2020-11-23 PROCEDURE — 6370000000 HC RX 637 (ALT 250 FOR IP): Performed by: INTERNAL MEDICINE

## 2020-11-23 RX ORDER — ACETAMINOPHEN AND CODEINE PHOSPHATE 300; 30 MG/1; MG/1
1 TABLET ORAL EVERY 6 HOURS PRN
Qty: 12 TABLET | Refills: 0 | Status: SHIPPED | OUTPATIENT
Start: 2020-11-23 | End: 2020-11-28

## 2020-11-23 RX ORDER — CEPHALEXIN 500 MG/1
500 CAPSULE ORAL EVERY 12 HOURS SCHEDULED
Qty: 14 CAPSULE | Refills: 0 | Status: ON HOLD | OUTPATIENT
Start: 2020-11-23 | End: 2020-12-17 | Stop reason: HOSPADM

## 2020-11-23 RX ORDER — CEPHALEXIN 500 MG/1
500 CAPSULE ORAL EVERY 12 HOURS SCHEDULED
Status: DISCONTINUED | OUTPATIENT
Start: 2020-11-23 | End: 2020-11-23 | Stop reason: HOSPADM

## 2020-11-23 RX ORDER — BENZONATATE 100 MG/1
100 CAPSULE ORAL 3 TIMES DAILY PRN
Qty: 60 CAPSULE | Refills: 1 | Status: SHIPPED | OUTPATIENT
Start: 2020-11-23 | End: 2020-11-30

## 2020-11-23 RX ORDER — CEPHALEXIN 500 MG/1
500 CAPSULE ORAL EVERY 12 HOURS SCHEDULED
Status: DISCONTINUED | OUTPATIENT
Start: 2020-11-23 | End: 2020-11-23

## 2020-11-23 RX ORDER — POTASSIUM CHLORIDE 7.45 MG/ML
10 INJECTION INTRAVENOUS ONCE
Status: COMPLETED | OUTPATIENT
Start: 2020-11-23 | End: 2020-11-23

## 2020-11-23 RX ADMIN — POTASSIUM CHLORIDE 10 MEQ: 7.46 INJECTION, SOLUTION INTRAVENOUS at 11:41

## 2020-11-23 RX ADMIN — CEPHALEXIN 500 MG: 500 CAPSULE ORAL at 13:44

## 2020-11-23 RX ADMIN — BENZONATATE 100 MG: 100 CAPSULE ORAL at 09:34

## 2020-11-23 RX ADMIN — FAMOTIDINE 20 MG: 10 INJECTION INTRAVENOUS at 09:37

## 2020-11-23 RX ADMIN — CEFTRIAXONE 1 G: 1 INJECTION, POWDER, FOR SOLUTION INTRAMUSCULAR; INTRAVENOUS at 04:15

## 2020-11-23 RX ADMIN — ENOXAPARIN SODIUM 40 MG: 40 INJECTION SUBCUTANEOUS at 09:33

## 2020-11-23 NOTE — PROGRESS NOTES
At bedside, patient sleeping comfortably, no signs of distress. Patient reports no complaints, pulse ox 95% on room air.

## 2020-11-23 NOTE — PROGRESS NOTES
Reviewed and educated patient on discharge instructions. Patient verbalizes understanding and has no further questions at this time. Patient has an NST scheduled for Thursday 11/26/20 at 1600, patient is to call the unit when she arrives. Patient given prescriptions, aware to drop them off and take as prescribed. Patient left the unit ambulatory by herself.

## 2020-11-23 NOTE — PROGRESS NOTES
hosp day 3    covid  Pregnancy  35 weeks  Baby  Reassuring  Pulse  Ox better  Unless  Up  Feels  Some better  Less  Shortness of breath  k better  Will recheck in am  Liver enzymes still up

## 2020-11-23 NOTE — PROGRESS NOTES
Upon entering patient's room, patient was sleeping. When awake, she stated that she feels much better than she has. O2 sat 94-97% on room air. Patient denies any SOB. Patient walked to bathroom and voided, tolerated well, O2 96%. Patient placed on EFM and breakfast at bedside. Patient call light in reach.

## 2020-11-23 NOTE — PROGRESS NOTES
Spoke with [facility], NP for internal medicine about possible discharge. Will call back with update on patient status.

## 2020-11-23 NOTE — PROGRESS NOTES
Notified Dr. Hiwot Chavarria that she is ok to be discharged from internal medicine standpoint. Asked to call physical therapy to see if they will come see patient to show her some exercises to go home with.

## 2020-11-23 NOTE — PROGRESS NOTES
[id number] Adry Kelley for discharge from [place] [id number] standpoint as long as she keeps her follow up appointments, and is monitored closely by OB.

## 2020-11-23 NOTE — PROGRESS NOTES
On report, was told CMP and CRP sent for morning labs. When this rn called down to lab for CMP results, specimen never received because patient stickers were not printed, was told by Verna Barrera in lab that I needed to draw another specimen.

## 2020-11-23 NOTE — PROGRESS NOTES
Notified Dr. Mally Martin of patient labs and that patient is refusing potassium. Orders obtained to give 10mEq/100ml mini bag once.

## 2020-11-23 NOTE — PROGRESS NOTES
[facility] Hospital[facility]   Progress Note    Admitting Date and Time: 11/19/2020  8:58 PM  Admit Dx: 35 weeks gestation of pregnancy [Z3A.35]     Seen for follow from medical side     Subjective:  Cough and sob has improved , feels better , maintaining sats on RA Eager to go home. ROS: denies fever, chills, cp, sob, n/v, HA unless stated above.  cephALEXin  500 mg Oral 2 times per day    enoxaparin  40 mg Subcutaneous BID    famotidine (PEPCID) injection  20 mg Intravenous BID    cefTRIAXone (ROCEPHIN) IV  1 g Intravenous Q12H    potassium bicarb-citric acid  20 mEq Oral BID     benzonatate, 100 mg, TID PRN  acetaminophen, 500 mg, Q4H PRN  promethazine, 6.25 mg, Q6H PRN  acetaminophen-codeine, 1 tablet, Q4H PRN  ondansetron, 4 mg, Q6H PRN         Objective:    /62   Pulse 86   Temp 97.7 °F (36.5 °C) (Oral)   Resp 16   Ht 5' 8\" (1.727 m)   Wt (!) 304 lb (137.9 kg)   LMP 03/18/2020   SpO2 97%   BMI 46.22 kg/m²   Due to patient's COVID-19 positive status, to reduce exposure, contamination and in  an effort to conserve PPE this patient was evaluated through a combination of review of the medical record,, nursing assessment and other physicians exams and assessments as well as telemedicine interaction. Alert well oriented   Heart RRR S1S2N  Lungs - bibasilar fine rales /improved  Abd - soft NT   Ext - FROM       Recent Labs     11/21/20  0615  11/22/20  0610 11/23/20  0945     --  139 140   K 3.1*   < > 3.3*  3.3* 3.3*     --  103 102   CO2 22  --  26 28   BUN 9  --  5* 4*   CREATININE 0.6  --  0.5 0.6   GLUCOSE 125*  --  114* 106*   CALCIUM 8.8  --  8.6 8.8    < > = values in this interval not displayed.        Recent Labs     11/20/20  1330 11/22/20  0610   WBC 9.5 8.4   RBC 4.67 4.19   HGB 12.8 11.6   HCT 41.8 37.3   MCV 89.5 89.0   MCH 27.4 27.7   MCHC 30.6* 31.1*   RDW 16.3* 16.1*    327   MPV 10.3 10.2       Labs and images reviewed      Radiology:   XR CHEST 1 VIEW   Final Result   Interstitial prominence in perihilar locations could suggest peribronchial   inflammatory changes. No focal airspace opacity or pleural effusion. Assessment:    Principal Problem:    Pneumonia due to COVID-19 virus  Active Problems:    Decreased fetal movements in third trimester    35 weeks gestation of pregnancy    Acute respiratory failure due to COVID-19 (HCC)    Elevated liver enzymes    Proteinuria    COVID-19  Resolved Problems:    * No resolved hospital problems. *      Plan:  Acute Hypoxic resp failure sec to viral pneumonia - required supplemental O2 and now much better & is titrated to RA     Acute Viral pneumonia - covid 19 + , procal 0.15, received decadron x1 in er., has improved and now maintaining sats on RA. It was mentioned in  previous notes that steroids will be continued but actually was not. Either way has improved. Will recommend close follow with PCP /OB     Elevated LFTs - follow with  OB. Ok to discharge from medical side/covid side.       Electronically signed by Phoebe Vides MD on 11/23/2020 at 12:36 PM

## 2020-11-24 NOTE — DISCHARGE SUMMARY
Admitted  11/19/20  Pregnancy  35 weeks  covid pneumonia  Consult mfm and covid  Team  Received steroid  Both for covid and  Baby   Was on o2  Until 24  Hrs  Prior to discharge discharged to home in stable condition 11 /23/20  Follow up  nst  In 2 days and apt in office  7 days for bpp

## 2020-11-26 ENCOUNTER — HOSPITAL ENCOUNTER (OUTPATIENT)
Age: 30
LOS: 1 days | Discharge: HOME | End: 2020-11-26
Attending: OBSTETRICS & GYNECOLOGY | Admitting: OBSTETRICS & GYNECOLOGY
Payer: COMMERCIAL

## 2020-11-26 VITALS
TEMPERATURE: 98.5 F | DIASTOLIC BLOOD PRESSURE: 84 MMHG | RESPIRATION RATE: 16 BRPM | HEART RATE: 105 BPM | SYSTOLIC BLOOD PRESSURE: 134 MMHG

## 2020-11-26 PROBLEM — Z36.89 NST (NON-STRESS TEST) REACTIVE: Status: ACTIVE | Noted: 2020-11-26

## 2020-11-26 PROCEDURE — 99211 OFF/OP EST MAY X REQ PHY/QHP: CPT

## 2020-11-26 PROCEDURE — 59025 FETAL NON-STRESS TEST: CPT | Performed by: OBSTETRICS & GYNECOLOGY

## 2020-11-26 PROCEDURE — 59025 FETAL NON-STRESS TEST: CPT

## 2020-11-26 NOTE — PROGRESS NOTES
. 36.1 weeks gestation. Presents to unit for scheduled NST for fetal well-being. Patient is covid positive and was admitted to unit for multiple days and discharged on . Patient states positive fm. Denies lof, vb, ctxs.

## 2020-12-07 LAB
Lab: NORMAL
REPORT: NORMAL
THIS TEST SENT TO: NORMAL

## 2020-12-16 ENCOUNTER — ANESTHESIA EVENT (OUTPATIENT)
Dept: LABOR AND DELIVERY | Age: 30
DRG: 560 | End: 2020-12-16
Payer: COMMERCIAL

## 2020-12-16 ENCOUNTER — ANESTHESIA (OUTPATIENT)
Dept: LABOR AND DELIVERY | Age: 30
DRG: 560 | End: 2020-12-16
Payer: COMMERCIAL

## 2020-12-16 ENCOUNTER — APPOINTMENT (OUTPATIENT)
Dept: LABOR AND DELIVERY | Age: 30
DRG: 560 | End: 2020-12-16
Payer: COMMERCIAL

## 2020-12-16 ENCOUNTER — HOSPITAL ENCOUNTER (INPATIENT)
Age: 30
LOS: 1 days | Discharge: HOME | DRG: 560 | End: 2020-12-17
Attending: OBSTETRICS & GYNECOLOGY | Admitting: OBSTETRICS & GYNECOLOGY
Payer: COMMERCIAL

## 2020-12-16 PROBLEM — U07.1 ACUTE RESPIRATORY FAILURE DUE TO COVID-19 (HCC): Status: RESOLVED | Noted: 2020-11-20 | Resolved: 2020-12-16

## 2020-12-16 PROBLEM — U07.1 PNEUMONIA DUE TO COVID-19 VIRUS: Status: RESOLVED | Noted: 2020-11-20 | Resolved: 2020-12-16

## 2020-12-16 PROBLEM — Z3A.35 35 WEEKS GESTATION OF PREGNANCY: Status: RESOLVED | Noted: 2020-11-20 | Resolved: 2020-12-16

## 2020-12-16 PROBLEM — Z3A.33 33 WEEKS GESTATION OF PREGNANCY: Status: RESOLVED | Noted: 2020-11-07 | Resolved: 2020-12-16

## 2020-12-16 PROBLEM — J12.82 PNEUMONIA DUE TO COVID-19 VIRUS: Status: RESOLVED | Noted: 2020-11-20 | Resolved: 2020-12-16

## 2020-12-16 PROBLEM — R80.9 PROTEINURIA: Status: RESOLVED | Noted: 2020-11-20 | Resolved: 2020-12-16

## 2020-12-16 PROBLEM — R74.8 ELEVATED LIVER ENZYMES: Status: RESOLVED | Noted: 2020-11-20 | Resolved: 2020-12-16

## 2020-12-16 PROBLEM — Z36.89 NST (NON-STRESS TEST) REACTIVE: Status: RESOLVED | Noted: 2020-11-26 | Resolved: 2020-12-16

## 2020-12-16 PROBLEM — J96.00 ACUTE RESPIRATORY FAILURE DUE TO COVID-19 (HCC): Status: RESOLVED | Noted: 2020-11-20 | Resolved: 2020-12-16

## 2020-12-16 LAB
ABO/RH: NORMAL
AMPHETAMINE SCREEN, URINE: NOT DETECTED
ANTIBODY SCREEN: NORMAL
BARBITURATE SCREEN URINE: NOT DETECTED
BENZODIAZEPINE SCREEN, URINE: NOT DETECTED
CANNABINOID SCREEN URINE: NOT DETECTED
COCAINE METABOLITE SCREEN URINE: NOT DETECTED
FENTANYL SCREEN, URINE: NOT DETECTED
HCT VFR BLD CALC: 37.3 % (ref 34–48)
HEMOGLOBIN: 11.6 G/DL (ref 11.5–15.5)
Lab: NORMAL
MCH RBC QN AUTO: 28.2 PG (ref 26–35)
MCHC RBC AUTO-ENTMCNC: 31.1 % (ref 32–34.5)
MCV RBC AUTO: 90.8 FL (ref 80–99.9)
METHADONE SCREEN, URINE: NOT DETECTED
OPIATE SCREEN URINE: NOT DETECTED
OXYCODONE URINE: NOT DETECTED
PDW BLD-RTO: 18.6 FL (ref 11.5–15)
PHENCYCLIDINE SCREEN URINE: NOT DETECTED
PLATELET # BLD: 224 E9/L (ref 130–450)
PMV BLD AUTO: 10.4 FL (ref 7–12)
RBC # BLD: 4.11 E12/L (ref 3.5–5.5)
WBC # BLD: 10.1 E9/L (ref 4.5–11.5)

## 2020-12-16 PROCEDURE — 2580000003 HC RX 258: Performed by: OBSTETRICS & GYNECOLOGY

## 2020-12-16 PROCEDURE — 6360000002 HC RX W HCPCS

## 2020-12-16 PROCEDURE — 3700000025 EPIDURAL BLOCK: Performed by: ANESTHESIOLOGY

## 2020-12-16 PROCEDURE — 1220000000 HC SEMI PRIVATE OB R&B

## 2020-12-16 PROCEDURE — 51701 INSERT BLADDER CATHETER: CPT

## 2020-12-16 PROCEDURE — 86900 BLOOD TYPING SEROLOGIC ABO: CPT

## 2020-12-16 PROCEDURE — 80307 DRUG TEST PRSMV CHEM ANLYZR: CPT

## 2020-12-16 PROCEDURE — 86850 RBC ANTIBODY SCREEN: CPT

## 2020-12-16 PROCEDURE — 6360000002 HC RX W HCPCS: Performed by: OBSTETRICS & GYNECOLOGY

## 2020-12-16 PROCEDURE — 6370000000 HC RX 637 (ALT 250 FOR IP)

## 2020-12-16 PROCEDURE — 2500000003 HC RX 250 WO HCPCS

## 2020-12-16 PROCEDURE — 6370000000 HC RX 637 (ALT 250 FOR IP): Performed by: OBSTETRICS & GYNECOLOGY

## 2020-12-16 PROCEDURE — 2500000003 HC RX 250 WO HCPCS: Performed by: ANESTHESIOLOGY

## 2020-12-16 PROCEDURE — 36415 COLL VENOUS BLD VENIPUNCTURE: CPT

## 2020-12-16 PROCEDURE — 85027 COMPLETE CBC AUTOMATED: CPT

## 2020-12-16 PROCEDURE — 86901 BLOOD TYPING SEROLOGIC RH(D): CPT

## 2020-12-16 PROCEDURE — 7200000001 HC VAGINAL DELIVERY

## 2020-12-16 RX ORDER — MODIFIED LANOLIN
OINTMENT (GRAM) TOPICAL PRN
Status: DISCONTINUED | OUTPATIENT
Start: 2020-12-16 | End: 2020-12-17 | Stop reason: HOSPADM

## 2020-12-16 RX ORDER — ALBUTEROL SULFATE 2.5 MG/3ML
2.5 SOLUTION RESPIRATORY (INHALATION) EVERY 6 HOURS PRN
Status: DISCONTINUED | OUTPATIENT
Start: 2020-12-16 | End: 2020-12-17 | Stop reason: HOSPADM

## 2020-12-16 RX ORDER — SODIUM CHLORIDE, SODIUM LACTATE, POTASSIUM CHLORIDE, CALCIUM CHLORIDE 600; 310; 30; 20 MG/100ML; MG/100ML; MG/100ML; MG/100ML
INJECTION, SOLUTION INTRAVENOUS CONTINUOUS
Status: DISCONTINUED | OUTPATIENT
Start: 2020-12-16 | End: 2020-12-17 | Stop reason: HOSPADM

## 2020-12-16 RX ORDER — SODIUM CHLORIDE 0.9 % (FLUSH) 0.9 %
10 SYRINGE (ML) INJECTION PRN
Status: DISCONTINUED | OUTPATIENT
Start: 2020-12-16 | End: 2020-12-17 | Stop reason: HOSPADM

## 2020-12-16 RX ORDER — ONDANSETRON 2 MG/ML
4 INJECTION INTRAMUSCULAR; INTRAVENOUS EVERY 6 HOURS PRN
Status: DISCONTINUED | OUTPATIENT
Start: 2020-12-16 | End: 2020-12-17 | Stop reason: HOSPADM

## 2020-12-16 RX ORDER — DOCUSATE SODIUM 100 MG/1
100 CAPSULE, LIQUID FILLED ORAL 2 TIMES DAILY
Status: DISCONTINUED | OUTPATIENT
Start: 2020-12-16 | End: 2020-12-17 | Stop reason: HOSPADM

## 2020-12-16 RX ORDER — EPHEDRINE SULFATE/0.9% NACL/PF 50 MG/5 ML
5 SYRINGE (ML) INTRAVENOUS PRN
Status: DISCONTINUED | OUTPATIENT
Start: 2020-12-16 | End: 2020-12-17 | Stop reason: HOSPADM

## 2020-12-16 RX ORDER — ACETAMINOPHEN 650 MG
TABLET, EXTENDED RELEASE ORAL
Status: DISPENSED
Start: 2020-12-16 | End: 2020-12-16

## 2020-12-16 RX ORDER — ONDANSETRON 2 MG/ML
4 INJECTION INTRAMUSCULAR; INTRAVENOUS EVERY 6 HOURS PRN
Status: DISCONTINUED | OUTPATIENT
Start: 2020-12-16 | End: 2020-12-16 | Stop reason: HOSPADM

## 2020-12-16 RX ORDER — ALBUTEROL SULFATE 90 UG/1
2 AEROSOL, METERED RESPIRATORY (INHALATION) EVERY 6 HOURS PRN
Status: DISCONTINUED | OUTPATIENT
Start: 2020-12-16 | End: 2020-12-16 | Stop reason: CLARIF

## 2020-12-16 RX ORDER — IBUPROFEN 800 MG/1
TABLET ORAL
Status: COMPLETED
Start: 2020-12-16 | End: 2020-12-16

## 2020-12-16 RX ORDER — NALOXONE HYDROCHLORIDE 0.4 MG/ML
0.4 INJECTION, SOLUTION INTRAMUSCULAR; INTRAVENOUS; SUBCUTANEOUS PRN
Status: DISCONTINUED | OUTPATIENT
Start: 2020-12-16 | End: 2020-12-16 | Stop reason: HOSPADM

## 2020-12-16 RX ORDER — ACETAMINOPHEN 325 MG/1
650 TABLET ORAL EVERY 4 HOURS PRN
Status: DISCONTINUED | OUTPATIENT
Start: 2020-12-16 | End: 2020-12-17 | Stop reason: HOSPADM

## 2020-12-16 RX ORDER — NALBUPHINE HCL 10 MG/ML
5 AMPUL (ML) INJECTION EVERY 4 HOURS PRN
Status: DISCONTINUED | OUTPATIENT
Start: 2020-12-16 | End: 2020-12-16 | Stop reason: HOSPADM

## 2020-12-16 RX ORDER — LIDOCAINE HYDROCHLORIDE 10 MG/ML
INJECTION, SOLUTION INFILTRATION; PERINEURAL
Status: COMPLETED
Start: 2020-12-16 | End: 2020-12-16

## 2020-12-16 RX ORDER — IBUPROFEN 800 MG/1
800 TABLET ORAL EVERY 8 HOURS
Status: DISCONTINUED | OUTPATIENT
Start: 2020-12-17 | End: 2020-12-17 | Stop reason: HOSPADM

## 2020-12-16 RX ORDER — SODIUM CHLORIDE 0.9 % (FLUSH) 0.9 %
10 SYRINGE (ML) INJECTION EVERY 12 HOURS SCHEDULED
Status: DISCONTINUED | OUTPATIENT
Start: 2020-12-16 | End: 2020-12-17 | Stop reason: HOSPADM

## 2020-12-16 RX ADMIN — SODIUM CHLORIDE, PRESERVATIVE FREE 10 ML: 5 INJECTION INTRAVENOUS at 20:13

## 2020-12-16 RX ADMIN — Medication 15 ML: at 09:30

## 2020-12-16 RX ADMIN — Medication 166.7 ML/HR: at 13:52

## 2020-12-16 RX ADMIN — IBUPROFEN 800 MG: 800 TABLET, FILM COATED ORAL at 20:47

## 2020-12-16 RX ADMIN — Medication 15 ML/HR: at 09:30

## 2020-12-16 RX ADMIN — SERTRALINE HYDROCHLORIDE 25 MG: 50 TABLET ORAL at 20:14

## 2020-12-16 RX ADMIN — LIDOCAINE HYDROCHLORIDE 200 MG: 10 INJECTION, SOLUTION INFILTRATION; PERINEURAL at 13:50

## 2020-12-16 RX ADMIN — SODIUM CHLORIDE, POTASSIUM CHLORIDE, SODIUM LACTATE AND CALCIUM CHLORIDE: 600; 310; 30; 20 INJECTION, SOLUTION INTRAVENOUS at 06:15

## 2020-12-16 RX ADMIN — Medication 95 MILLI-UNITS/MIN: at 14:02

## 2020-12-16 RX ADMIN — DOCUSATE SODIUM 100 MG: 100 CAPSULE ORAL at 20:14

## 2020-12-16 ASSESSMENT — PAIN SCALES - GENERAL
PAINLEVEL_OUTOF10: 6
PAINLEVEL_OUTOF10: 6
PAINLEVEL_OUTOF10: 3
PAINLEVEL_OUTOF10: 3
PAINLEVEL_OUTOF10: 6

## 2020-12-16 ASSESSMENT — PAIN DESCRIPTION - PROGRESSION
CLINICAL_PROGRESSION: NOT CHANGED
CLINICAL_PROGRESSION: NOT CHANGED
CLINICAL_PROGRESSION: GRADUALLY WORSENING

## 2020-12-16 ASSESSMENT — PAIN DESCRIPTION - ONSET
ONSET: GRADUAL
ONSET: ON-GOING
ONSET: ON-GOING

## 2020-12-16 ASSESSMENT — PAIN DESCRIPTION - ORIENTATION
ORIENTATION: LOWER

## 2020-12-16 ASSESSMENT — PAIN DESCRIPTION - PAIN TYPE
TYPE: ACUTE PAIN

## 2020-12-16 ASSESSMENT — PAIN DESCRIPTION - DESCRIPTORS
DESCRIPTORS: CRAMPING

## 2020-12-16 ASSESSMENT — PAIN DESCRIPTION - LOCATION
LOCATION: ABDOMEN
LOCATION: ABDOMEN;PELVIS
LOCATION: ABDOMEN;PELVIS

## 2020-12-16 ASSESSMENT — PAIN - FUNCTIONAL ASSESSMENT
PAIN_FUNCTIONAL_ASSESSMENT: ACTIVITIES ARE NOT PREVENTED

## 2020-12-16 ASSESSMENT — PAIN DESCRIPTION - FREQUENCY
FREQUENCY: CONTINUOUS

## 2020-12-16 NOTE — PROGRESS NOTES
at 39w presents for ctxs since 0300 and bloody show. GBS-. Denies any LO. Reports good fetal movement. Hx COVID 11/15/20.

## 2020-12-16 NOTE — PROGRESS NOTES
Anesthesia came to bedside to check on patient after bolus. Pt states her pain is still unrelieved. Anesthesia states to give another 5cc bolus.

## 2020-12-16 NOTE — PROGRESS NOTES
Call out to anesthesia to bolus patients epidural d/t unrelieved pain. Stated that they would be over.

## 2020-12-16 NOTE — L&D DELIVERY NOTE
Delivery note   of viable male at 46  Small  First degree lac not  Repaired  apgars  9 9 bw not done spontaneous placenta iv pit  ebl 200cc mom and baby satis condition

## 2020-12-16 NOTE — ANESTHESIA PROCEDURE NOTES
Epidural Block    Patient location during procedure: OB  Start time: 12/16/2020 9:08 AM  End time: 12/16/2020 9:33 AM  Reason for block: labor epidural  Staffing  Performed: other anesthesia staff   Anesthesiologist: Douglas Avery MD  Resident/CRNA: YOSEF Alva - TEO  Other anesthesia staff: Janetet Olivas RN  Preanesthetic Checklist  Completed: patient identified, IV checked, site marked, risks and benefits discussed, surgical consent, monitors and equipment checked, pre-op evaluation, timeout performed, anesthesia consent given, oxygen available and patient being monitored  Epidural  Patient position: sitting  Prep: ChloraPrep and site prepped and draped  Patient monitoring: cardiac monitor, frequent blood pressure checks and continuous pulse ox  Approach: midline  Location: lumbar (1-5)  Injection technique: SANJU air  Provider prep: mask, sterile gloves and sterile gown  Needle  Needle type: Tuohy   Needle gauge: 18 G  Needle length: 3.5 in  Needle insertion depth: 9.5 cm  Catheter type: side hole  Catheter size: 19 G  Catheter at skin depth: 14 cm  Test dose: negative  Assessment  Hemodynamics: stable  Attempts: 1

## 2020-12-16 NOTE — PROGRESS NOTES
House Ob  Called at 13:47 to come to room 10 for a precipitous delivery. Upon immediate entry into room, patient was delivered by RN with infant on mother's abdomen, cord attached. Male infant, delivery time 13:48. Following 30\" delay, cord was clamped and cut. Placenta delivered intact. First degree right labial superficial laceration, anesthetized with 1% lidocaine, no suture repair necessary. Dr Braden Casiano present following delivery.

## 2020-12-16 NOTE — PROGRESS NOTES
Birth of viable baby boy via  at 46. Apgars /9. Weight: 7 lbs 0 oz. House officer called to bedside for delivery d/t patient involuntary pushing. Nurse assisted delivery. Placenta delivered at [phone]. Baby came out pink and crying. Bonding well with mom at this time.

## 2020-12-16 NOTE — PROGRESS NOTES
Called Dr. Wild Davila to update her on SROM at 1150 and SVE 7/90/-2. Told her that patient is not really comfortable with epidural and she states to contact anesthesia. No further orders given at this time.

## 2020-12-16 NOTE — PROGRESS NOTES
Called to patients bedside. Pt states she is feeling tons of pressure. Cath and checked pt 8-9/90/-2. Pt States that her body is involuntarily pushing. Call out to Dr. Unruly Mosher. States to put the pt in knee-chest for 10 minutes and call her back.

## 2020-12-16 NOTE — ANESTHESIA PRE PROCEDURE
Department of Anesthesiology  Preprocedure Note       Name:  Yasemin Kelley   Age:  26 y.o.  :  1990                                          MRN:  [id number]         Date:  2020      Surgeon: Dr. Maxi Huynh    Procedure:  Labor Epidural    Medications prior to admission:   Prior to Admission medications    Medication Sig Start Date End Date Taking?  Authorizing Provider   Ascorbic Acid (VITAMIN C) 1000 MG tablet Take 1 tablet by mouth daily 20  Yes YOSEF Vaca CNP   vitamin D (CHOLECALCIFEROL) 50 MCG (2000) TABS tablet Take 1 tablet by mouth daily 20  Yes YOSEF Vaca CNP   sertraline (ZOLOFT) 50 MG tablet Take 25 mg by mouth daily   Yes Historical Provider, MD   ondansetron (ZOFRAN) 4 MG tablet Take 4 mg by mouth every 8 hours as needed for Nausea or Vomiting   Yes Historical Provider, MD   metoclopramide (REGLAN) 5 MG tablet Take 5 mg by mouth 4 times daily   Yes Historical Provider, MD   Prenat w/o A-FeCbn-Meth-FA-DHA (PRENATE MINI PO) Take by mouth   Yes Historical Provider, MD   cephALEXin (KEFLEX) 500 MG capsule Take 1 capsule by mouth every 12 hours 20   Berto Mcfadden DO   famotidine (PEPCID) 20 MG/2ML injection Infuse 2 mLs intravenously 2 times daily 20  Berto Mcfadden DO   albuterol sulfate  (90 Base) MCG/ACT inhaler Inhale 2 puffs into the lungs every 6 hours as needed for Wheezing 20   YOSEF Walker CNP   Zinc 100 MG TABS Take 1 tablet by mouth daily 20   YOSEF Walker CNP       Current medications:    Current Facility-Administered Medications   Medication Dose Route Frequency Provider Last Rate Last Admin    lactated ringers infusion   Intravenous Continuous Berto Mcfadden  mL/hr at 20 0615 New Bag at 20 0615    oxytocin (PITOCIN) 10 unit bolus from the bag  10 Units Intravenous PRN [address], DO        And  oxytocin (PITOCIN) 30 units in 500 mL infusion  87.3 taran-units/min Intravenous PRN Breto Mcfadden, DO        ondansetron (ZOFRAN) injection 4 mg  4 mg Intravenous Q6H PRN Berto Mcfadden, DO        oxytocin (PITOCIN) 30 units in 500 mL infusion  1 taran-units/min Intravenous Continuous Berto Mcfadden, DO        povidone-iodine (BETADINE) 10 % external solution             lidocaine 1 % injection             oxytocin (PITOCIN) 30 units in 500 mL infusion Override Pull                Allergies:  No Known Allergies    Problem List:    Patient Active Problem List   Diagnosis Code    M[facility] morbid obesity in third trimester, [facility] O99.213, E66.01    39 weeks gestation of pregnancy Z3A.39    Anxiety disorder F41.9    Postpartum depression O99.345, F53.0       Past Medical History:        Diagnosis Date    44 weeks gestation of pregnancy 12/30/2017    Anxiety disorder 2017    not tx due to pregnancy    Breast disorder 2010    bilateral breastreduction    Encounter for fetal anatomic survey     Migraine     Migraine     Postpartum depression 2014    was not recoginizd until later.         Past Surgical History:        Procedure Laterality Date    BREAST SURGERY      Bilat breast reduction    CHOLECYSTECTOMY  2014    GALLBLADDER SURGERY      REMOVAL       Social History:    Social History     Tobacco Use    Smoking status: Former Smoker     Types: Cigarettes    Smokeless tobacco: Former User    Tobacco comment: very rarely   Substance Use Topics    Alcohol use: No     Comment: social drinker                                Counseling given: Not Answered  Comment: very rarely      Vital Signs (Current):   Vitals:    12/16/20 0513 12/16/20 0515 12/16/20 0525 12/16/20 0730   BP: (!) 144/88  (!) 144/84 132/86   Pulse: 117  107 92   Resp: 18  18 18   Temp: 36.7 °C (98.1 °F)   36.6 °C (97.8 °F)   TempSrc: Oral   Oral   Weight:  297 lb (134.7 kg)     Height:  5' 8\" (1.727 m) BP Readings from Last 3 Encounters:   12/16/20 132/86   11/26/20 134/84   11/23/20 124/62       NPO Status:                                                                                 BMI:   Wt Readings from Last 3 Encounters:   12/16/20 297 lb (134.7 kg)   11/20/20 (!) 304 lb (137.9 kg)   11/07/20 (!) 304 lb (137.9 kg)     Body mass index is 45.16 kg/m². CBC:   Lab Results   Component Value Date    WBC 10.1 12/16/2020    RBC 4.11 12/16/2020    HGB 11.6 12/16/2020    HCT 37.3 12/16/2020    MCV 90.8 12/16/2020    RDW 18.6 12/16/2020     12/16/2020       CMP:   Lab Results   Component Value Date     11/23/2020    K 3.3 11/23/2020    K 3.3 11/22/2020     11/23/2020    CO2 28 11/23/2020    BUN 4 11/23/2020    CREATININE 0.6 11/23/2020    GFRAA >60 11/23/2020    LABGLOM >60 11/23/2020    GLUCOSE 106 11/23/2020    PROT 6.1 11/23/2020    CALCIUM 8.8 11/23/2020    BILITOT 0.7 11/23/2020    ALKPHOS 173 11/23/2020    AST 85 11/23/2020     11/23/2020       POC Tests: No results for input(s): POCGLU, POCNA, POCK, POCCL, POCBUN, POCHEMO, POCHCT in the last 72 hours.     Coags:   Lab Results   Component Value Date    PROTIME 17.4 11/22/2020    INR 1.5 11/22/2020       HCG (If Applicable): No results found for: PREGTESTUR, PREGSERUM, HCG, HCGQUANT     ABGs: No results found for: PHART, PO2ART, JOU2ENE, XBW9KSO, BEART, N9HAAKKP     Type & Screen (If Applicable):  No results found for: LABABO, LABRH    Drug/Infectious Status (If Applicable):  No results found for: HIV, HEPCAB    COVID-19 Screening (If Applicable): No results found for: COVID19      Anesthesia Evaluation  Patient summary reviewed and Nursing notes reviewed no history of anesthetic complications:   Airway: Mallampati: III  TM distance: >3 FB   Neck ROM: full  Mouth opening: > = 3 FB Dental: normal exam     Comment: Tongue piercing Pulmonary:Negative Pulmonary ROS breath sounds clear to auscultation                             Cardiovascular:Negative CV ROS            Rhythm: regular  Rate: normal                    Neuro/Psych:   (+) headaches:, psychiatric history: stable with treatmentdepression/anxiety             GI/Hepatic/Renal: Neg GI/Hepatic/Renal ROS            Endo/Other:                      ROS comment: In Labor Abdominal:           Vascular: negative vascular ROS. Anesthesia Plan      epidural     ASA 2             Anesthetic plan and risks discussed with patient.         Attending anesthesiologist reviewed and agrees with Pre Eval content              YOSEF Blanco - CRNA   12/16/2020

## 2020-12-16 NOTE — H&P
Department of Obstetrics and Gynecology  Labor and Delivery  History & Physical    Patient:  Justyn Files Date:  2020  4:52 AM  Medical Record Number:  [id number]    CHIEF COMPLAINT: IUP at 44 weeks complaining of uterine contractions    PROBLEM LIST:     Patient Active Problem List   Diagnosis    Maternal morbid obesity in third trimester, antepartum ([address]    39 weeks gestation of pregnancy    Anxiety disorder    Postpartum depression           HISTORY OF PRESENT ILLNESS:    The patient is a 26 y.o. female  at 39w0d. Patient presents with a chief complaint of uterine contractions and a bloody mucousy discharge since 0300. She is the scheduled 7 AM induction. GBS is negative. She denies leaking fluid. She has no symptoms of UTI or PIH. Patient was Covid + November 15, 2020 and is currently asymptomatic. ESTIMATED DUE DATE: Estimated Date of Delivery: 20    PRENATAL CARE:  Complicated by: Obesity, history of Covid pneumonia during the course of her pregnancy (2020)  GBS: negative    Past OB History  OB History        3    Para   2    Term   2            AB        Living   2       SAB        TAB        Ectopic        Molar        Multiple        Live Births   2                Past Medical History:        Diagnosis Date    44 weeks gestation of pregnancy 2017    Anxiety disorder 2017    not tx due to pregnancy    Breast disorder     bilateral breastreduction    Encounter for fetal anatomic survey     Migraine     Migraine     Postpartum depression     was not recoginizd until later. Past Surgical History:        Procedure Laterality Date    BREAST SURGERY      Bilat breast reduction    CHOLECYSTECTOMY  2014    GALLBLADDER SURGERY      REMOVAL       Allergies:  Patient has no known allergies.     Social History:    Social History     Socioeconomic History    Marital status: Legally      Spouse name: Not on file  Number of children: Not on file    Years of education: Not on file    Highest education level: Not on file   Occupational History    Not on file   Social Needs    Financial resource strain: Not on file    Food insecurity     Worry: Not on file     Inability: Not on file    Transportation needs     Medical: Not on file     Non-medical: Not on file   Tobacco Use    Smoking status: Former Smoker     Types: Cigarettes    Smokeless tobacco: Former User    Tobacco comment: very rarely   Substance and Sexual Activity    Alcohol use: No     Comment: social drinker    Drug use: No    Sexual activity: Yes     Partners: Male   Lifestyle    Physical activity     Days per week: Not on file     Minutes per session: Not on file    Stress: Not on file   Relationships    Social connections     Talks on phone: Not on file     Gets together: Not on file     Attends Anabaptist service: Not on file     Active member of club or organization: Not on file     Attends meetings of clubs or organizations: Not on file     Relationship status: Not on file    Intimate partner violence     Fear of current or ex partner: Not on file     Emotionally abused: Not on file     Physically abused: Not on file     Forced sexual activity: Not on file   Other Topics Concern    Not on file   Social History Narrative    Not on file       Family History:       Problem Relation Age of Onset    No Known Problems Mother     Heart Disease Father     Heart Attack Father     No Known Problems Brother     Breast Cancer Maternal Grandmother        Medications Prior to Admission:  Medications Prior to Admission: Ascorbic Acid (VITAMIN C) 1000 MG tablet, Take 1 tablet by mouth daily  vitamin D (CHOLECALCIFEROL) 50 MCG (2000 UT) TABS tablet, Take 1 tablet by mouth daily  sertraline (ZOLOFT) 50 MG tablet, Take 25 mg by mouth daily  ondansetron (ZOFRAN) 4 MG tablet, Take 4 mg by mouth every 8 hours as needed for Nausea or Vomiting  metoclopramide

## 2020-12-17 VITALS
TEMPERATURE: 98.9 F | HEART RATE: 88 BPM | SYSTOLIC BLOOD PRESSURE: 151 MMHG | OXYGEN SATURATION: 100 % | DIASTOLIC BLOOD PRESSURE: 84 MMHG | BODY MASS INDEX: 44.41 KG/M2 | WEIGHT: 293 LBS | RESPIRATION RATE: 16 BRPM | HEIGHT: 68 IN

## 2020-12-17 LAB
HCT VFR BLD CALC: 35.2 % (ref 34–48)
HEMOGLOBIN: 11.1 G/DL (ref 11.5–15.5)
MCH RBC QN AUTO: 28.6 PG (ref 26–35)
MCHC RBC AUTO-ENTMCNC: 31.5 % (ref 32–34.5)
MCV RBC AUTO: 90.7 FL (ref 80–99.9)
PDW BLD-RTO: 18.6 FL (ref 11.5–15)
PLATELET # BLD: 213 E9/L (ref 130–450)
PMV BLD AUTO: 10.7 FL (ref 7–12)
RBC # BLD: 3.88 E12/L (ref 3.5–5.5)
WBC # BLD: 9.4 E9/L (ref 4.5–11.5)

## 2020-12-17 PROCEDURE — 6370000000 HC RX 637 (ALT 250 FOR IP): Performed by: OBSTETRICS & GYNECOLOGY

## 2020-12-17 PROCEDURE — 85027 COMPLETE CBC AUTOMATED: CPT

## 2020-12-17 PROCEDURE — 36415 COLL VENOUS BLD VENIPUNCTURE: CPT

## 2020-12-17 RX ORDER — IBUPROFEN 800 MG/1
800 TABLET ORAL EVERY 8 HOURS PRN
Qty: 120 TABLET | Refills: 3 | Status: SHIPPED | OUTPATIENT
Start: 2020-12-17 | End: 2020-12-17

## 2020-12-17 RX ORDER — IBUPROFEN 800 MG/1
800 TABLET ORAL EVERY 8 HOURS PRN
Qty: 120 TABLET | Refills: 3 | Status: SHIPPED | OUTPATIENT
Start: 2020-12-17

## 2020-12-17 RX ADMIN — DOCUSATE SODIUM 100 MG: 100 CAPSULE ORAL at 09:20

## 2020-12-17 RX ADMIN — IBUPROFEN 800 MG: 800 TABLET, FILM COATED ORAL at 16:44

## 2020-12-17 RX ADMIN — IBUPROFEN 800 MG: 800 TABLET, FILM COATED ORAL at 05:40

## 2020-12-17 ASSESSMENT — PAIN SCALES - GENERAL
PAINLEVEL_OUTOF10: 0
PAINLEVEL_OUTOF10: 4

## 2020-12-17 NOTE — ANESTHESIA POSTPROCEDURE EVALUATION
Department of Anesthesiology  Postprocedure Note    Patient: Maryuri Duncan  MRN: [id number]  [place]: 1990  Date of evaluation: 12/17/2020  Time:  8:46 AM     Procedure Summary     Date: 12/16/20 Room / Location:     Anesthesia Start: 0908 Anesthesia Stop: 7832    Procedure: Labor Analgesia Diagnosis:     Scheduled Providers:  Responsible Provider: Jacob Jefferson MD    Anesthesia Type: epidural ASA Status: 2          Anesthesia Type: epidural    Suzi Phase I:      Suzi Phase II:      Last vitals: Reviewed and per EMR flowsheets.        Anesthesia Post Evaluation    Patient location during evaluation: bedside  Patient participation: complete - patient participated  Level of consciousness: awake and alert  Airway patency: patent  Nausea & Vomiting: no nausea and no vomiting  Complications: no  Cardiovascular status: hemodynamically stable  Respiratory status: acceptable and room air  Hydration status: euvolemic

## 2020-12-17 NOTE — FLOWSHEET NOTE
Discharge teaching and instructions given to patient for self and . Safe sleep and  safety discussed. Post partum depression discussed. Patient verbalizes understanding of follow up for self in 6 weeks and  in 2-3 days and aware to make appointment at office.
Discharged to home with  and friend via wheelchair
Patient admitted into room, oriented to surroundings. Patient given the [facility] informational sheet, and contents explained. Educated parents on safe sleep practices for baby. Instructed to call for the first time up to bathroom. Fundus midline, firm and palpated at U/U. Small amount of rubra lochia noted with no clots. Patient denies any pain or dizziness while sitting up in bed. Vital signs stable upon admission to the unit. Phone at patient's side, instructed to use it for any needs.
Detail Level: Detailed
Detail Level: Zone

## 2020-12-17 NOTE — PROGRESS NOTES
Hearing screening results were discussed with parent. Questions answered. Brochure given to parent. Advised to monitor developmental milestones and contact physician for any concerns.    Wilma Paiz

## 2020-12-17 NOTE — DISCHARGE SUMMARY
Admitted  1216/20  Pregnancy 39 weeks  Active labor   Prior  covid  Hx  Of  depression  Vaginal delivery 12/16  Discharged to home in stable conditin 12/17/20  rx motrin follouwp  6 weeks

## 2020-12-17 NOTE — PLAN OF CARE
Problem: VAGINAL DELIVERY - RECOVERY AND POST PARTUM  Goal: Vital signs are medically acceptable  Outcome: Met This Shift  Goal: Patient will remain free of falls  Outcome: Met This Shift  Goal: Fundus firm at midline  Outcome: Met This Shift  Goal: Moderate rubra without clots, no purulent discharge, no foul smelling lochia  Outcome: Met This Shift  Goal: Empties bladder  Outcome: Met This Shift

## 2020-12-17 NOTE — LACTATION NOTE
Pt reports baby is latching fairly well but nipple is misshapen after feeding. Pt reports breastfeeding 2 other children and both had tongue/lip ties and feels this baby has the same issue. A short/tight frenulum noted on tongue and lip. Nipple shield given with explanation for use until baby can be evaluated by ENT. Hand pump also given for use for extra stimulation as needed. Encouraged skin to skin and frequent attempts at breast with hand expression to stimulate milk production. Instructed on normal infant behavior in the first 12-24 hours. Encouraged to feed infant as often and as long as the infant wishes to do so. Information given regarding health benefits of colostrum and exclusive breastfeeding. Encouraged to call with any concerns.

## 2020-12-28 RX ORDER — GLUCOSAMINE/CHONDR SU A SOD 750-600 MG
TABLET ORAL
Qty: 30 CAPSULE | Refills: 2 | Status: SHIPPED
Start: 2020-12-28 | End: 2021-04-05

## 2021-03-21 DIAGNOSIS — U07.1 COVID-19 AFFECTING PREGNANCY IN THIRD TRIMESTER: ICD-10-CM

## 2021-03-21 DIAGNOSIS — O98.513 COVID-19 AFFECTING PREGNANCY IN THIRD TRIMESTER: ICD-10-CM

## 2021-03-22 RX ORDER — MULTIVIT WITH MINERALS/LUTEIN
TABLET ORAL
Qty: 30 TABLET | Refills: 2 | Status: SHIPPED
Start: 2021-03-22 | End: 2021-03-29

## 2021-03-28 DIAGNOSIS — O98.513 COVID-19 AFFECTING PREGNANCY IN THIRD TRIMESTER: ICD-10-CM

## 2021-03-28 DIAGNOSIS — U07.1 COVID-19 AFFECTING PREGNANCY IN THIRD TRIMESTER: ICD-10-CM

## 2021-03-29 RX ORDER — MULTIVIT WITH MINERALS/LUTEIN
TABLET ORAL
Qty: 30 TABLET | Refills: 2 | Status: SHIPPED | OUTPATIENT
Start: 2021-03-29

## 2021-04-04 DIAGNOSIS — U07.1 COVID-19 AFFECTING PREGNANCY IN THIRD TRIMESTER: ICD-10-CM

## 2021-04-04 DIAGNOSIS — O98.513 COVID-19 AFFECTING PREGNANCY IN THIRD TRIMESTER: ICD-10-CM

## 2021-04-05 RX ORDER — GLUCOSAMINE/CHONDR SU A SOD 750-600 MG
TABLET ORAL
Qty: 30 CAPSULE | Refills: 2 | Status: SHIPPED | OUTPATIENT
Start: 2021-04-05

## 2021-04-20 DIAGNOSIS — F41.1 GENERALIZED ANXIETY DISORDER: Primary | ICD-10-CM

## 2021-04-20 RX ORDER — SERTRALINE HYDROCHLORIDE 25 MG/1
25 TABLET, FILM COATED ORAL DAILY
Qty: 30 TABLET | Refills: 2 | Status: SHIPPED | OUTPATIENT
Start: 2021-04-20